# Patient Record
Sex: FEMALE | Race: WHITE | URBAN - NONMETROPOLITAN AREA
[De-identification: names, ages, dates, MRNs, and addresses within clinical notes are randomized per-mention and may not be internally consistent; named-entity substitution may affect disease eponyms.]

---

## 2022-06-15 ENCOUNTER — HOSPITAL ENCOUNTER (EMERGENCY)
Age: 52
Discharge: LEFT BEFORE DISCHARGE | End: 2022-06-15

## 2022-06-15 PROCEDURE — 75810000275 HC EMERGENCY DEPT VISIT NO LEVEL OF CARE

## 2022-11-28 ENCOUNTER — APPOINTMENT (OUTPATIENT)
Dept: CT IMAGING | Age: 52
End: 2022-11-28
Attending: INTERNAL MEDICINE

## 2022-11-28 ENCOUNTER — HOSPITAL ENCOUNTER (OUTPATIENT)
Age: 52
Setting detail: OBSERVATION
Discharge: HOME | End: 2022-11-29
Attending: INTERNAL MEDICINE | Admitting: INTERNAL MEDICINE

## 2022-11-28 ENCOUNTER — APPOINTMENT (OUTPATIENT)
Dept: GENERAL RADIOLOGY | Age: 52
End: 2022-11-28
Attending: INTERNAL MEDICINE

## 2022-11-28 DIAGNOSIS — N39.0 URINARY TRACT INFECTION WITHOUT HEMATURIA, SITE UNSPECIFIED: ICD-10-CM

## 2022-11-28 DIAGNOSIS — E83.42 HYPOMAGNESEMIA: ICD-10-CM

## 2022-11-28 DIAGNOSIS — R42 DIZZINESS: Primary | ICD-10-CM

## 2022-11-28 DIAGNOSIS — E87.20 LACTIC ACIDOSIS: ICD-10-CM

## 2022-11-28 PROBLEM — R55 NEAR SYNCOPE: Status: ACTIVE | Noted: 2022-11-28

## 2022-11-28 LAB
ALBUMIN SERPL-MCNC: 3.2 G/DL (ref 3.4–5)
ALBUMIN/GLOB SERPL: 0.6 {RATIO} (ref 0.8–1.7)
ALP SERPL-CCNC: 98 U/L (ref 45–117)
ALT SERPL-CCNC: 34 U/L (ref 13–56)
ANION GAP SERPL CALC-SCNC: 12 MMOL/L (ref 3–18)
APPEARANCE UR: ABNORMAL
AST SERPL W P-5'-P-CCNC: 61 U/L (ref 10–38)
ATRIAL RATE: 90 BPM
BACTERIA URNS QL MICRO: ABNORMAL /HPF
BASOPHILS # BLD: 0 K/UL (ref 0–0.1)
BASOPHILS NFR BLD: 0 % (ref 0–2)
BILIRUB SERPL-MCNC: 0.3 MG/DL (ref 0.2–1)
BILIRUB UR QL: ABNORMAL
BUN SERPL-MCNC: 12 MG/DL (ref 7–18)
BUN/CREAT SERPL: 12 (ref 12–20)
CA-I BLD-MCNC: 9.2 MG/DL (ref 8.5–10.1)
CALCULATED P AXIS, ECG09: 12 DEGREES
CALCULATED R AXIS, ECG10: 60 DEGREES
CALCULATED T AXIS, ECG11: 62 DEGREES
CHLORIDE SERPL-SCNC: 98 MMOL/L (ref 100–111)
CO2 SERPL-SCNC: 24 MMOL/L (ref 21–32)
COLOR UR: ABNORMAL
CREAT SERPL-MCNC: 0.99 MG/DL (ref 0.6–1.3)
DIAGNOSIS, 93000: NORMAL
DIFFERENTIAL METHOD BLD: ABNORMAL
EOSINOPHIL # BLD: 0.1 K/UL (ref 0–0.4)
EOSINOPHIL NFR BLD: 1 % (ref 0–5)
EPITH CASTS URNS QL MICRO: ABNORMAL /LPF (ref 0–20)
ERYTHROCYTE [DISTWIDTH] IN BLOOD BY AUTOMATED COUNT: 12.8 % (ref 11.6–14.5)
FLUAV AG NPH QL IA: NEGATIVE
FLUBV AG NOSE QL IA: NEGATIVE
GLOBULIN SER CALC-MCNC: 5.5 G/DL (ref 2–4)
GLUCOSE BLD STRIP.AUTO-MCNC: 195 MG/DL (ref 70–110)
GLUCOSE SERPL-MCNC: 225 MG/DL (ref 74–99)
GLUCOSE UR STRIP.AUTO-MCNC: 100 MG/DL
HCT VFR BLD AUTO: 36 % (ref 35–45)
HGB BLD-MCNC: 11.3 G/DL (ref 12–16)
HGB UR QL STRIP: NEGATIVE
IMM GRANULOCYTES # BLD AUTO: 0 K/UL (ref 0–0.04)
IMM GRANULOCYTES NFR BLD AUTO: 0 % (ref 0–0.5)
KETONES UR QL STRIP.AUTO: ABNORMAL MG/DL
LACTATE SERPL-SCNC: 3 MMOL/L (ref 0.4–2)
LACTATE SERPL-SCNC: 3.6 MMOL/L (ref 0.4–2)
LACTATE SERPL-SCNC: 4 MMOL/L (ref 0.4–2)
LACTATE SERPL-SCNC: 4 MMOL/L (ref 0.4–2)
LEUKOCYTE ESTERASE UR QL STRIP.AUTO: ABNORMAL
LYMPHOCYTES # BLD: 1.8 K/UL (ref 0.9–3.6)
LYMPHOCYTES NFR BLD: 24 % (ref 21–52)
MAGNESIUM SERPL-MCNC: 1.5 MG/DL (ref 1.6–2.6)
MCH RBC QN AUTO: 26.9 PG (ref 24–34)
MCHC RBC AUTO-ENTMCNC: 31.4 G/DL (ref 31–37)
MCV RBC AUTO: 85.7 FL (ref 78–100)
MONOCYTES # BLD: 0.4 K/UL (ref 0.05–1.2)
MONOCYTES NFR BLD: 5 % (ref 3–10)
NEUTS SEG # BLD: 5.1 K/UL (ref 1.8–8)
NEUTS SEG NFR BLD: 70 % (ref 40–73)
NITRITE UR QL STRIP.AUTO: NEGATIVE
NRBC # BLD: 0 K/UL (ref 0–0.01)
NRBC BLD-RTO: 0 PER 100 WBC
P-R INTERVAL, ECG05: 115 MS
PERFORMED BY, TECHID: ABNORMAL
PH UR STRIP: 6 [PH] (ref 5–8)
PLATELET # BLD AUTO: 255 K/UL (ref 135–420)
PMV BLD AUTO: 11.4 FL (ref 9.2–11.8)
POTASSIUM SERPL-SCNC: 4 MMOL/L (ref 3.5–5.5)
PROT SERPL-MCNC: 8.7 G/DL (ref 6.4–8.2)
PROT UR STRIP-MCNC: 30 MG/DL
Q-T INTERVAL, ECG07: 401 MS
QRS DURATION, ECG06: 87 MS
QTC CALCULATION (BEZET), ECG08: 491 MS
RBC # BLD AUTO: 4.2 M/UL (ref 4.2–5.3)
RBC #/AREA URNS HPF: ABNORMAL /HPF (ref 0–2)
SODIUM SERPL-SCNC: 134 MMOL/L (ref 136–145)
SP GR UR REFRACTOMETRY: 1.03 (ref 1–1.03)
TROPONIN-HIGH SENSITIVITY: 4 NG/L (ref 0–54)
UROBILINOGEN UR QL STRIP.AUTO: 1 EU/DL (ref 0.2–1)
VENTRICULAR RATE, ECG03: 90 BPM
WBC # BLD AUTO: 7.4 K/UL (ref 4.6–13.2)
WBC URNS QL MICRO: ABNORMAL /HPF (ref 0–4)

## 2022-11-28 PROCEDURE — G0378 HOSPITAL OBSERVATION PER HR: HCPCS

## 2022-11-28 PROCEDURE — 99285 EMERGENCY DEPT VISIT HI MDM: CPT

## 2022-11-28 PROCEDURE — 93005 ELECTROCARDIOGRAM TRACING: CPT

## 2022-11-28 PROCEDURE — 96375 TX/PRO/DX INJ NEW DRUG ADDON: CPT

## 2022-11-28 PROCEDURE — 74011250636 HC RX REV CODE- 250/636: Performed by: NURSE PRACTITIONER

## 2022-11-28 PROCEDURE — 82962 GLUCOSE BLOOD TEST: CPT

## 2022-11-28 PROCEDURE — 85025 COMPLETE CBC W/AUTO DIFF WBC: CPT

## 2022-11-28 PROCEDURE — 83605 ASSAY OF LACTIC ACID: CPT

## 2022-11-28 PROCEDURE — 87804 INFLUENZA ASSAY W/OPTIC: CPT

## 2022-11-28 PROCEDURE — 84484 ASSAY OF TROPONIN QUANT: CPT

## 2022-11-28 PROCEDURE — 74011250636 HC RX REV CODE- 250/636: Performed by: INTERNAL MEDICINE

## 2022-11-28 PROCEDURE — 80053 COMPREHEN METABOLIC PANEL: CPT

## 2022-11-28 PROCEDURE — 83735 ASSAY OF MAGNESIUM: CPT

## 2022-11-28 PROCEDURE — 96365 THER/PROPH/DIAG IV INF INIT: CPT

## 2022-11-28 PROCEDURE — 81001 URINALYSIS AUTO W/SCOPE: CPT

## 2022-11-28 PROCEDURE — 74011000258 HC RX REV CODE- 258: Performed by: NURSE PRACTITIONER

## 2022-11-28 PROCEDURE — 71045 X-RAY EXAM CHEST 1 VIEW: CPT

## 2022-11-28 PROCEDURE — 74011000250 HC RX REV CODE- 250: Performed by: NURSE PRACTITIONER

## 2022-11-28 PROCEDURE — 70450 CT HEAD/BRAIN W/O DYE: CPT

## 2022-11-28 PROCEDURE — 96366 THER/PROPH/DIAG IV INF ADDON: CPT

## 2022-11-28 PROCEDURE — 74011636637 HC RX REV CODE- 636/637: Performed by: NURSE PRACTITIONER

## 2022-11-28 PROCEDURE — 87086 URINE CULTURE/COLONY COUNT: CPT

## 2022-11-28 RX ORDER — LOSARTAN POTASSIUM 25 MG/1
25 TABLET ORAL DAILY
COMMUNITY
Start: 2022-03-21

## 2022-11-28 RX ORDER — SODIUM CHLORIDE 0.9 % (FLUSH) 0.9 %
5-40 SYRINGE (ML) INJECTION EVERY 8 HOURS
Status: DISCONTINUED | OUTPATIENT
Start: 2022-11-28 | End: 2022-11-29 | Stop reason: HOSPADM

## 2022-11-28 RX ORDER — ACETAMINOPHEN 325 MG/1
650 TABLET ORAL
Status: DISCONTINUED | OUTPATIENT
Start: 2022-11-28 | End: 2022-11-29 | Stop reason: HOSPADM

## 2022-11-28 RX ORDER — FLUOXETINE HYDROCHLORIDE 40 MG/1
80 CAPSULE ORAL DAILY
COMMUNITY
Start: 2022-10-18

## 2022-11-28 RX ORDER — PRAVASTATIN SODIUM 20 MG/1
10 TABLET ORAL DAILY
Status: DISCONTINUED | OUTPATIENT
Start: 2022-11-29 | End: 2022-11-29 | Stop reason: HOSPADM

## 2022-11-28 RX ORDER — FLUOXETINE HYDROCHLORIDE 20 MG/1
80 CAPSULE ORAL DAILY
Status: DISCONTINUED | OUTPATIENT
Start: 2022-11-29 | End: 2022-11-29 | Stop reason: HOSPADM

## 2022-11-28 RX ORDER — DROSPIRENONE AND ETHINYL ESTRADIOL 0.02-3(28)
1 KIT ORAL DAILY
COMMUNITY

## 2022-11-28 RX ORDER — MAGNESIUM SULFATE HEPTAHYDRATE 500 MG/ML
2 INJECTION, SOLUTION INTRAMUSCULAR; INTRAVENOUS
Status: DISCONTINUED | OUTPATIENT
Start: 2022-11-28 | End: 2022-11-28

## 2022-11-28 RX ORDER — SODIUM CHLORIDE 0.9 % (FLUSH) 0.9 %
5-40 SYRINGE (ML) INJECTION AS NEEDED
Status: DISCONTINUED | OUTPATIENT
Start: 2022-11-28 | End: 2022-11-29 | Stop reason: HOSPADM

## 2022-11-28 RX ORDER — ONDANSETRON 2 MG/ML
4 INJECTION INTRAMUSCULAR; INTRAVENOUS
Status: DISCONTINUED | OUTPATIENT
Start: 2022-11-28 | End: 2022-11-29 | Stop reason: HOSPADM

## 2022-11-28 RX ORDER — MAGNESIUM SULFATE 100 %
4 CRYSTALS MISCELLANEOUS AS NEEDED
Status: DISCONTINUED | OUTPATIENT
Start: 2022-11-28 | End: 2022-11-29 | Stop reason: HOSPADM

## 2022-11-28 RX ORDER — ENOXAPARIN SODIUM 100 MG/ML
40 INJECTION SUBCUTANEOUS DAILY
Status: DISCONTINUED | OUTPATIENT
Start: 2022-11-29 | End: 2022-11-29 | Stop reason: HOSPADM

## 2022-11-28 RX ORDER — SODIUM CHLORIDE 450 MG/100ML
100 INJECTION, SOLUTION INTRAVENOUS CONTINUOUS
Status: DISPENSED | OUTPATIENT
Start: 2022-11-28 | End: 2022-11-29

## 2022-11-28 RX ORDER — PRAVASTATIN SODIUM 10 MG/1
10 TABLET ORAL
COMMUNITY
Start: 2022-10-07

## 2022-11-28 RX ORDER — MAGNESIUM SULFATE HEPTAHYDRATE 40 MG/ML
2 INJECTION, SOLUTION INTRAVENOUS
Status: COMPLETED | OUTPATIENT
Start: 2022-11-28 | End: 2022-11-28

## 2022-11-28 RX ORDER — METFORMIN HYDROCHLORIDE 500 MG/1
1000 TABLET ORAL 2 TIMES DAILY WITH MEALS
COMMUNITY
Start: 2022-04-04 | End: 2022-11-29

## 2022-11-28 RX ORDER — INSULIN LISPRO 100 [IU]/ML
INJECTION, SOLUTION INTRAVENOUS; SUBCUTANEOUS
Status: DISCONTINUED | OUTPATIENT
Start: 2022-11-28 | End: 2022-11-29 | Stop reason: HOSPADM

## 2022-11-28 RX ORDER — ONDANSETRON 4 MG/1
4 TABLET, ORALLY DISINTEGRATING ORAL
Status: DISCONTINUED | OUTPATIENT
Start: 2022-11-28 | End: 2022-11-29 | Stop reason: HOSPADM

## 2022-11-28 RX ORDER — CYCLOBENZAPRINE HCL 10 MG
10 TABLET ORAL
Status: ON HOLD | COMMUNITY
Start: 2022-08-17 | End: 2022-11-29 | Stop reason: SDUPTHER

## 2022-11-28 RX ORDER — LOSARTAN POTASSIUM 25 MG/1
25 TABLET ORAL DAILY
Status: CANCELLED | OUTPATIENT
Start: 2022-11-29

## 2022-11-28 RX ORDER — ACETAMINOPHEN 650 MG/1
650 SUPPOSITORY RECTAL
Status: DISCONTINUED | OUTPATIENT
Start: 2022-11-28 | End: 2022-11-29 | Stop reason: HOSPADM

## 2022-11-28 RX ORDER — POLYETHYLENE GLYCOL 3350 17 G/17G
17 POWDER, FOR SOLUTION ORAL DAILY PRN
Status: DISCONTINUED | OUTPATIENT
Start: 2022-11-28 | End: 2022-11-29 | Stop reason: HOSPADM

## 2022-11-28 RX ADMIN — INSULIN LISPRO 2 UNITS: 100 INJECTION, SOLUTION INTRAVENOUS; SUBCUTANEOUS at 22:39

## 2022-11-28 RX ADMIN — SODIUM CHLORIDE, PRESERVATIVE FREE 10 ML: 5 INJECTION INTRAVENOUS at 21:31

## 2022-11-28 RX ADMIN — CEFTRIAXONE 1 G: 1 INJECTION, POWDER, FOR SOLUTION INTRAMUSCULAR; INTRAVENOUS at 21:31

## 2022-11-28 RX ADMIN — SODIUM CHLORIDE, PRESERVATIVE FREE 10 ML: 5 INJECTION INTRAVENOUS at 22:45

## 2022-11-28 RX ADMIN — SODIUM CHLORIDE 1000 ML: 9 INJECTION, SOLUTION INTRAVENOUS at 13:41

## 2022-11-28 RX ADMIN — SODIUM CHLORIDE 100 ML/HR: 4.5 INJECTION, SOLUTION INTRAVENOUS at 21:31

## 2022-11-28 RX ADMIN — MAGNESIUM SULFATE HEPTAHYDRATE 2 G: 40 INJECTION, SOLUTION INTRAVENOUS at 13:41

## 2022-11-28 NOTE — ED TRIAGE NOTES
States she feels like she had a seizure yesterday while [place] [employer],  states she went limp and he lowered her to the floor. Denies urination on self, states did bite tongue, has had a seizure in the past, does not take seizure meds. States she feel tired and dizzy with diarrhea x 2 this morning. Denies n/v. Denies pain. Dizziness increases with orthostatic bp , worse when changing positions.

## 2022-11-28 NOTE — ROUTINE PROCESS
TRANSFER - OUT REPORT:    Verbal report given to Regan Castorena LPN(name) on Benny Zabala  being transferred to [facility] (unit) for routine progression of care       Report consisted of patients Situation, Background, Assessment and   Recommendations(SBAR). Information from the following report(s) SBAR, ED Summary, MAR, and Recent Results was reviewed with the receiving nurse. Lines:   Peripheral IV 11/28/22 Right Antecubital (Active)   Site Assessment Clean, dry, & intact 11/28/22 0947   Phlebitis Assessment 0 11/28/22 0947   Infiltration Assessment 0 11/28/22 0947   Dressing Status Clean, dry, & intact 11/28/22 0947   Hub Color/Line Status Pink 11/28/22 0947   Action Taken Blood drawn 11/28/22 0947        Opportunity for questions and clarification was provided.       Patient transported with:   Monitor

## 2022-11-28 NOTE — ED PROVIDER NOTES
EMERGENCY DEPARTMENT HISTORY AND PHYSICAL EXAM      Date: 11/28/2022  Patient Name: Cletus Habermann    History of Presenting Illness     Chief Complaint   Patient presents with    Seizure       History Provided By: Patient    HPI: Cletus Habermann, 50 y.o. female with history of diabetes, hyperlipidemia, depression, elevated LFTs that states that she was dizzy yesterday when she was out shopping and that she felt lightheaded like she was going up faint and her  lowered her to the floor. She spent most of the yesterday in bed resting. No syncopal episode and no witnessed seizure activity. She states that she woke up this morning again feeling slightly dizzy along with a frontal headache. She states that she felt this way about 3 years ago and wondered having a seizure possibly related to hypoglycemia. She denies any nausea, vomiting, fever or chills. She states that he had discomfort in the right side of the neck. She had some mild diarrhea this morning. She is felt febrile. She denies any sore throat runny nose cough or abdominal pain. Patient does not smoke cigarettes. PCP Dr. Cameron  at the [facility]. She denies any chest pain, shortness of breath, hemoptysis. There are no other complaints, changes, or physical findings at this time. Past History   Past Medical History:  History reviewed. No pertinent past medical history. Past Surgical History:  History reviewed. No pertinent surgical history. Family History:  History reviewed. No pertinent family history. Social History:  Social History     Tobacco Use    Smoking status: Never    Smokeless tobacco: Never   Substance Use Topics    Alcohol use: Not Currently    Drug use: Not Currently       Allergies:  No Known Allergies    PCP: Karly Lopez MD    Current Outpatient Medications   Medication Sig Dispense Refill    cyclobenzaprine (FLEXERIL) 10 mg tablet Take 10 mg by mouth every eight (8) hours as needed. FLUoxetine (PROzac) 40 mg capsule Take 80 mg by mouth daily. pravastatin (PRAVACHOL) 10 mg tablet Take 10 mg by mouth.      losartan (COZAAR) 25 mg tablet Take 25 mg by mouth daily. SITagliptin (JANUVIA) 100 mg tablet Take 100 mg by mouth daily. metFORMIN (GLUCOPHAGE) 500 mg tablet Take 1,000 mg by mouth two (2) times daily (with meals). drospirenone-ethinyl estradioL (ANDREW) 3-0.02 mg tab Take 1 Tablet by mouth daily. Review of Systems   Review of Systems   Constitutional:  Positive for fever. Negative for chills. HENT:  Negative for rhinorrhea and sore throat. Respiratory:  Negative for cough and shortness of breath. Cardiovascular:  Negative for chest pain. Gastrointestinal:  Positive for diarrhea. Negative for abdominal pain and nausea. Genitourinary:  Negative for dysuria. Musculoskeletal:  Positive for neck pain. Negative for arthralgias. Skin:  Negative for rash. Neurological:  Positive for dizziness and light-headedness. Negative for syncope. Physical Exam   Physical Exam  Vitals and nursing note reviewed. Constitutional:       General: She is not in acute distress. Appearance: Normal appearance. She is well-developed. HENT:      Head: Normocephalic. Mouth/Throat:      Pharynx: No oropharyngeal exudate. Eyes:      Extraocular Movements: Extraocular movements intact. Pupils: Pupils are equal, round, and reactive to light. Neck:      Thyroid: No thyromegaly. Vascular: No JVD. Cardiovascular:      Rate and Rhythm: Normal rate and regular rhythm. Heart sounds: No murmur heard. No gallop. Pulmonary:      Effort: Pulmonary effort is normal. No respiratory distress. Breath sounds: Normal breath sounds. No wheezing or rales. Abdominal:      General: Bowel sounds are normal. There is no distension. Palpations: Abdomen is soft. Tenderness: There is no abdominal tenderness.    Musculoskeletal:         General: Normal range of motion. Cervical back: Neck supple. Right lower leg: No edema. Left lower leg: No edema. Skin:     General: Skin is warm. Findings: No erythema. Neurological:      Mental Status: She is alert and oriented to person, place, and time. Cranial Nerves: No cranial nerve deficit. Motor: No weakness. Psychiatric:         Behavior: Behavior normal.       Lab and Diagnostic Study Results   Labs -     Recent Results (from the past 12 hour(s))   EKG, 12 LEAD, INITIAL    Collection Time: 11/28/22  9:40 AM   Result Value Ref Range    Ventricular Rate 90 BPM    Atrial Rate 90 BPM    P-R Interval 115 ms    QRS Duration 87 ms    Q-T Interval 401 ms    QTC Calculation (Bezet) 491 ms    Calculated P Axis 12 degrees    Calculated R Axis 60 degrees    Calculated T Axis 62 degrees    Diagnosis       Sinus rhythm  Borderline short [place] interval  Borderline T abnormalities, anterior leads  Borderline prolonged QT interval  Baseline wander in lead(s) II,III,aVF    Confirmed by Lexx Bearden ([id number]) on 11/28/2022 11:21:32 AM     CBC WITH AUTOMATED DIFF    Collection Time: 11/28/22  9:47 AM   Result Value Ref Range    WBC 7.4 4.6 - 13.2 K/uL    RBC 4.20 4. 20 - 5.30 M/uL    HGB 11.3 (L) 12.0 - 16.0 g/dL    HCT 36.0 35.0 - 45.0 %    MCV 85.7 78.0 - 100.0 FL    MCH 26.9 24.0 - 34.0 PG    MCHC 31.4 31.0 - 37.0 g/dL    RDW 12.8 11.6 - 14.5 %    PLATELET 869 940 - 276 K/uL    MPV 11.4 9.2 - 11.8 FL    NRBC 0.0 0.0  WBC    ABSOLUTE NRBC 0.00 0.00 - 0.01 K/uL    NEUTROPHILS 70 40 - 73 %    LYMPHOCYTES 24 21 - 52 %    MONOCYTES 5 3 - 10 %    EOSINOPHILS 1 0 - 5 %    BASOPHILS 0 0 - 2 %    IMMATURE GRANULOCYTES 0 0 - 0.5 %    ABS. NEUTROPHILS 5.1 1.8 - 8.0 K/UL    ABS. LYMPHOCYTES 1.8 0.9 - 3.6 K/UL    ABS. MONOCYTES 0.4 0.05 - 1.2 K/UL    ABS. EOSINOPHILS 0.1 0.0 - 0.4 K/UL    ABS. BASOPHILS 0.0 0.0 - 0.1 K/UL    ABS. IMM.  GRANS. 0.0 0.00 - 0.04 K/UL    DF AUTOMATED     METABOLIC PANEL, COMPREHENSIVE Collection Time: 11/28/22  9:47 AM   Result Value Ref Range    Sodium 134 (L) 136 - 145 mmol/L    Potassium 4.0 3.5 - 5.5 mmol/L    Chloride 98 (L) 100 - 111 mmol/L    CO2 24 21 - 32 mmol/L    Anion gap 12 3.0 - 18.0 mmol/L    Glucose 225 (H) 74 - 99 mg/dL    BUN 12 7 - 18 mg/dL    Creatinine 0.99 0.60 - 1.30 mg/dL    BUN/Creatinine ratio 12 12 - 20      eGFR >60 >60 ml/min/1.73m2    Calcium 9.2 8.5 - 10.1 mg/dL    Bilirubin, total 0.3 0.2 - 1.0 mg/dL    AST (SGOT) 61 (H) 10 - 38 U/L    ALT (SGPT) 34 13 - 56 U/L    Alk.  phosphatase 98 45 - 117 U/L    Protein, total 8.7 (H) 6.4 - 8.2 g/dL    Albumin 3.2 (L) 3.4 - 5.0 g/dL    Globulin 5.5 (H) 2.0 - 4.0 g/dL    A-G Ratio 0.6 (L) 0.8 - 1.7     TROPONIN-HIGH SENSITIVITY    Collection Time: 11/28/22  9:47 AM   Result Value Ref Range    Troponin-High Sensitivity 4 0 - 54 ng/L   LACTIC ACID    Collection Time: 11/28/22  9:47 AM   Result Value Ref Range    Lactic acid 4.0 (HH) 0.4 - 2.0 mmol/L   MAGNESIUM    Collection Time: 11/28/22  9:47 AM   Result Value Ref Range    Magnesium 1.5 (L) 1.6 - 2.6 mg/dL   URINALYSIS W/ RFLX MICROSCOPIC    Collection Time: 11/28/22 10:09 AM   Result Value Ref Range    Color Dark Yellow      Appearance Cloudy      Specific gravity 1.030 1.005 - 1.030      pH (UA) 6.0 5.0 - 8.0      Protein 30 (A) Negative mg/dL    Glucose 100 (A) Negative mg/dL    Ketone Trace (A) Negative mg/dL    Bilirubin Small (A) Negative      Blood Negative Negative      Urobilinogen 1.0 0.2 - 1.0 EU/dL    Nitrites Negative Negative      Leukocyte Esterase Moderate (A) Negative     URINE MICROSCOPIC    Collection Time: 11/28/22 10:09 AM   Result Value Ref Range    WBC 5-10 0 - 4 /hpf    RBC 0-5 0 - 2 /hpf    Epithelial cells Many 0 - 20 /lpf    Bacteria 2+ (A) None /hpf   INFLUENZA A & B AG (RAPID TEST)    Collection Time: 11/28/22 12:50 PM   Result Value Ref Range    Influenza A Antigen Negative Negative      Influenza B Antigen Negative Negative     LACTIC ACID Collection Time: 11/28/22  1:05 PM   Result Value Ref Range    Lactic acid 3.6 (HH) 0.4 - 2.0 mmol/L   LACTIC ACID    Collection Time: 11/28/22  4:15 PM   Result Value Ref Range    Lactic acid 4.0 (HH) 0.4 - 2.0 mmol/L       Radiologic Studies -   [unfilled]  CT Results  (Last 48 hours)                 11/28/22 1111  CT HEAD WO CONT Final result    Impression:      No acute intracranial abnormality. Narrative:  EXAM: CT head       INDICATION: Headache. COMPARISON: 11/23/2019. TECHNIQUE: Axial CT imaging of the head was performed without intravenous   contrast. Standard multiplanar coronal and sagittal reformatted images were   obtained and are included in interpretation. One or more dose reduction techniques were used on this CT: automated exposure   control, adjustment of the mAs and/or kVp according to patient size, and   iterative reconstruction techniques. The specific techniques used on this CT   exam have been documented in the patient's electronic medical record. Digital   Imaging and Communications in Medicine (DICOM) format image data are available   to nonaffiliated external healthcare facilities or entities on a secure, media   free, reciprocally searchable basis with patient authorization for at least a   12-month period after this study. _______________       FINDINGS:       BRAIN AND POSTERIOR FOSSA: No evidence of acute large vessel transcortical   infarct or acute parenchymal hemorrhage. No midline shift or hydrocephalus. EXTRA-AXIAL SPACES AND MENINGES: There are no abnormal extra-axial fluid   collections. CALVARIUM: Intact. SINUSES: Clear.        OTHER: None.       _______________                 CXR Results  (Last 48 hours)                 11/28/22 1121  XR CHEST PORT Final result    Impression:      No acute findings in the chest.        Narrative:  EXAM: XR CHEST PORT       CLINICAL INDICATION/HISTORY: dizziness   -Additional: None COMPARISON: None       TECHNIQUE: Frontal view of the chest       _______________       FINDINGS:       HEART AND MEDIASTINUM: Normal cardiac size and mediastinal contours. LUNGS AND PLEURAL SPACES: No focal pneumonic consolidation, pneumothorax, or   pleural effusion. BONY THORAX AND SOFT TISSUES: No acute osseous abnormality       _______________                   Medical Decision Making and ED Course   Differential Diagnosis & Medical Decision Making Provider Note:   DDX: Dizziness - To include, but not limited to the following: vertigo, AOM, URI, anemia, electrolyte imbalance, hypoglycemia, hyperglycemia, hypotension, arrhythmia, TIA / CVA, near syncope, BPV, medication induced     - I am the first and primary provider for this patient. I reviewed the vital signs, available nursing notes, past medical history, past surgical history, family history and social history. The patient's presenting problems have been discussed, and the staff are in agreement with the care plan formulated and outlined with them. I have encouraged them to ask questions as they arise throughout their visit. Vital Signs-Reviewed the patient's vital signs. Patient Vitals for the past 12 hrs:   Temp Pulse Resp BP SpO2   11/28/22 1647 -- 89 17 130/77 99 %   11/28/22 1420 -- 100 18 (!) 114/58 99 %   11/28/22 1343 -- 90 21 129/77 99 %   11/28/22 1130 -- 95 18 120/77 100 %   11/28/22 0937 -- (!) 110 20 (!) 146/89 100 %   11/28/22 0935 -- 93 16 (!) 153/88 100 %   11/28/22 0928 98.3 °F (36.8 °C) 96 14 (!) 165/80 100 %       EKG interpretation: (Preliminary): EKG Interpreted by me. 0940 Shows normal sinus rhythm 90/min. Normal CT, normal QRS, normal axis, normal QTC. Nonspecific T wave changes. No acute ST segment changes.     ED Course:    4:10 PM  Pt feels better; labs and radiology reviewed; will repeat lactic and dc if normalizes    6:38 PM  Case discussed with DENA Noriega; pt with UTI and lactic acidosis; will admit    Procedures and Critical Care         Disposition   Disposition: DISCHARGE    Diagnosis/Clinical Impression     Clinical Impression:   1. Dizziness    2. Lactic acidosis    3. Hypomagnesemia    4. Urinary tract infection without hematuria, site unspecified        Attestations: Elmer Malloy MD, am the primary clinician of record. Please note that this dictation was completed with [employer], the computer voice recognition software. Quite often unanticipated grammatical, syntax, homophones, and other interpretive errors are inadvertently transcribed by the computer software. Please disregard these errors. Please excuse any errors that have escaped final proofreading. Thank you.

## 2022-11-29 ENCOUNTER — APPOINTMENT (OUTPATIENT)
Dept: NON INVASIVE DIAGNOSTICS | Age: 52
End: 2022-11-29
Attending: NURSE PRACTITIONER

## 2022-11-29 VITALS
DIASTOLIC BLOOD PRESSURE: 67 MMHG | BODY MASS INDEX: 32.95 KG/M2 | HEIGHT: 64 IN | SYSTOLIC BLOOD PRESSURE: 134 MMHG | RESPIRATION RATE: 18 BRPM | TEMPERATURE: 96.8 F | WEIGHT: 193 LBS | OXYGEN SATURATION: 100 % | HEART RATE: 86 BPM

## 2022-11-29 LAB
ANION GAP SERPL CALC-SCNC: 10 MMOL/L (ref 3–18)
BACTERIA SPEC CULT: NORMAL
BUN SERPL-MCNC: 8 MG/DL (ref 7–18)
BUN/CREAT SERPL: 11 (ref 12–20)
CA-I BLD-MCNC: 8.6 MG/DL (ref 8.5–10.1)
CHLORIDE SERPL-SCNC: 102 MMOL/L (ref 100–111)
CO2 SERPL-SCNC: 24 MMOL/L (ref 21–32)
COLONY COUNT,CNT: NORMAL
COLONY COUNT,CNT: NORMAL
CREAT SERPL-MCNC: 0.71 MG/DL (ref 0.6–1.3)
ECHO AO ASC DIAM: 3 CM
ECHO AO ASCENDING AORTA INDEX: 1.55 CM/M2
ECHO AO ROOT DIAM: 3.1 CM
ECHO AO ROOT INDEX: 1.61 CM/M2
ECHO AV AREA PEAK VELOCITY: 2.8 CM2
ECHO AV AREA VTI: 2.9 CM2
ECHO AV AREA/BSA PEAK VELOCITY: 1.5 CM2/M2
ECHO AV AREA/BSA VTI: 1.5 CM2/M2
ECHO AV MEAN GRADIENT: 4 MMHG
ECHO AV MEAN VELOCITY: 0.9 M/S
ECHO AV PEAK GRADIENT: 6 MMHG
ECHO AV PEAK VELOCITY: 1.2 M/S
ECHO AV VELOCITY RATIO: 0.83
ECHO AV VTI: 24 CM
ECHO LA AREA 2C: 16.5 CM2
ECHO LA AREA 4C: 21.9 CM2
ECHO LA DIAMETER INDEX: 2.18 CM/M2
ECHO LA DIAMETER: 4.2 CM
ECHO LA MAJOR AXIS: 6.4 CM
ECHO LA MINOR AXIS: 5.3 CM
ECHO LA TO AORTIC ROOT RATIO: 1.35
ECHO LA VOL BP: 51 ML (ref 22–52)
ECHO LA VOL/BSA BIPLANE: 26 ML/M2 (ref 16–34)
ECHO LV E' LATERAL VELOCITY: 12 CM/S
ECHO LV E' SEPTAL VELOCITY: 8 CM/S
ECHO LV EDV A2C: 41 ML
ECHO LV EDV A4C: 52 ML
ECHO LV EDV INDEX A4C: 27 ML/M2
ECHO LV EDV NDEX A2C: 21 ML/M2
ECHO LV EJECTION FRACTION A2C: 65 %
ECHO LV EJECTION FRACTION A4C: 65 %
ECHO LV EJECTION FRACTION BIPLANE: 65 % (ref 55–100)
ECHO LV ESV A2C: 14 ML
ECHO LV ESV A4C: 18 ML
ECHO LV ESV INDEX A2C: 7 ML/M2
ECHO LV ESV INDEX A4C: 9 ML/M2
ECHO LV FRACTIONAL SHORTENING: 36 % (ref 28–44)
ECHO LV INTERNAL DIMENSION DIASTOLE INDEX: 2.18 CM/M2
ECHO LV INTERNAL DIMENSION DIASTOLIC: 4.2 CM (ref 3.9–5.3)
ECHO LV INTERNAL DIMENSION SYSTOLIC INDEX: 1.4 CM/M2
ECHO LV INTERNAL DIMENSION SYSTOLIC: 2.7 CM
ECHO LV IVSD: 1 CM (ref 0.6–0.9)
ECHO LV MASS 2D: 137.2 G (ref 67–162)
ECHO LV MASS INDEX 2D: 71.1 G/M2 (ref 43–95)
ECHO LV POSTERIOR WALL DIASTOLIC: 1 CM (ref 0.6–0.9)
ECHO LV RELATIVE WALL THICKNESS RATIO: 0.48
ECHO LVOT AREA: 3.5 CM2
ECHO LVOT AV VTI INDEX: 0.85
ECHO LVOT DIAM: 2.1 CM
ECHO LVOT MEAN GRADIENT: 2 MMHG
ECHO LVOT PEAK GRADIENT: 4 MMHG
ECHO LVOT PEAK VELOCITY: 1 M/S
ECHO LVOT STROKE VOLUME INDEX: 36.4 ML/M2
ECHO LVOT SV: 70.3 ML
ECHO LVOT VTI: 20.3 CM
ECHO MV A VELOCITY: 0.85 M/S
ECHO MV AREA VTI: 4.3 CM2
ECHO MV E DECELERATION TIME (DT): 151 MS
ECHO MV E VELOCITY: 0.96 M/S
ECHO MV E/A RATIO: 1.13
ECHO MV E/E' LATERAL: 8
ECHO MV E/E' RATIO (AVERAGED): 10
ECHO MV E/E' SEPTAL: 12
ECHO MV LVOT VTI INDEX: 0.81
ECHO MV MAX VELOCITY: 1.1 M/S
ECHO MV MEAN GRADIENT: 2 MMHG
ECHO MV MEAN VELOCITY: 0.7 M/S
ECHO MV PEAK GRADIENT: 5 MMHG
ECHO MV VTI: 16.5 CM
ECHO PV MAX VELOCITY: 0.8 M/S
ECHO PV PEAK GRADIENT: 3 MMHG
ECHO RA AREA 4C: 13.6 CM2
ECHO RA END SYSTOLIC VOLUME APICAL 4 CHAMBER INDEX BSA: 16 ML/M2
ECHO RA VOLUME: 30 ML
ECHO RV BASAL DIMENSION: 3.6 CM
ECHO RV TAPSE: 1.6 CM (ref 1.7–?)
ERYTHROCYTE [DISTWIDTH] IN BLOOD BY AUTOMATED COUNT: 12.7 % (ref 11.6–14.5)
GLUCOSE BLD STRIP.AUTO-MCNC: 148 MG/DL (ref 70–110)
GLUCOSE BLD STRIP.AUTO-MCNC: 240 MG/DL (ref 70–110)
GLUCOSE SERPL-MCNC: 132 MG/DL (ref 74–99)
HCT VFR BLD AUTO: 33.1 % (ref 35–45)
HGB BLD-MCNC: 10.4 G/DL (ref 12–16)
LACTATE SERPL-SCNC: 1.8 MMOL/L (ref 0.4–2)
LACTATE SERPL-SCNC: 2.4 MMOL/L (ref 0.4–2)
LACTATE SERPL-SCNC: 2.8 MMOL/L (ref 0.4–2)
MAGNESIUM SERPL-MCNC: 1.9 MG/DL (ref 1.6–2.6)
MCH RBC QN AUTO: 26.7 PG (ref 24–34)
MCHC RBC AUTO-ENTMCNC: 31.4 G/DL (ref 31–37)
MCV RBC AUTO: 85.1 FL (ref 78–100)
NRBC # BLD: 0 K/UL (ref 0–0.01)
NRBC BLD-RTO: 0 PER 100 WBC
PERFORMED BY, TECHID: ABNORMAL
PERFORMED BY, TECHID: ABNORMAL
PLATELET # BLD AUTO: 199 K/UL (ref 135–420)
PMV BLD AUTO: 11.2 FL (ref 9.2–11.8)
POTASSIUM SERPL-SCNC: 4 MMOL/L (ref 3.5–5.5)
RBC # BLD AUTO: 3.89 M/UL (ref 4.2–5.3)
SODIUM SERPL-SCNC: 136 MMOL/L (ref 136–145)
SPECIAL REQUESTS,SREQ: NORMAL
WBC # BLD AUTO: 5.7 K/UL (ref 4.6–13.2)

## 2022-11-29 PROCEDURE — 83605 ASSAY OF LACTIC ACID: CPT

## 2022-11-29 PROCEDURE — 36415 COLL VENOUS BLD VENIPUNCTURE: CPT

## 2022-11-29 PROCEDURE — 74011000250 HC RX REV CODE- 250: Performed by: NURSE PRACTITIONER

## 2022-11-29 PROCEDURE — 96374 THER/PROPH/DIAG INJ IV PUSH: CPT

## 2022-11-29 PROCEDURE — 97161 PT EVAL LOW COMPLEX 20 MIN: CPT

## 2022-11-29 PROCEDURE — 74011636637 HC RX REV CODE- 636/637: Performed by: NURSE PRACTITIONER

## 2022-11-29 PROCEDURE — 74011250637 HC RX REV CODE- 250/637: Performed by: NURSE PRACTITIONER

## 2022-11-29 PROCEDURE — 80048 BASIC METABOLIC PNL TOTAL CA: CPT

## 2022-11-29 PROCEDURE — 85027 COMPLETE CBC AUTOMATED: CPT

## 2022-11-29 PROCEDURE — 83735 ASSAY OF MAGNESIUM: CPT

## 2022-11-29 PROCEDURE — 82962 GLUCOSE BLOOD TEST: CPT

## 2022-11-29 PROCEDURE — G0378 HOSPITAL OBSERVATION PER HR: HCPCS

## 2022-11-29 RX ORDER — CIPROFLOXACIN 500 MG/1
500 TABLET ORAL 2 TIMES DAILY
Qty: 14 TABLET | Refills: 0 | Status: SHIPPED | OUTPATIENT
Start: 2022-11-29 | End: 2022-12-06

## 2022-11-29 RX ORDER — CYCLOBENZAPRINE HCL 10 MG
10 TABLET ORAL
Qty: 30 TABLET | Refills: 0 | Status: SHIPPED
Start: 2022-11-29 | End: 2022-12-29

## 2022-11-29 RX ADMIN — FLUOXETINE 80 MG: 20 CAPSULE ORAL at 08:11

## 2022-11-29 RX ADMIN — INSULIN LISPRO 4 UNITS: 100 INJECTION, SOLUTION INTRAVENOUS; SUBCUTANEOUS at 11:42

## 2022-11-29 RX ADMIN — SODIUM CHLORIDE, PRESERVATIVE FREE 10 ML: 5 INJECTION INTRAVENOUS at 05:33

## 2022-11-29 RX ADMIN — PRAVASTATIN SODIUM 10 MG: 20 TABLET ORAL at 08:11

## 2022-11-29 RX ADMIN — ACETAMINOPHEN 650 MG: 325 TABLET ORAL at 06:14

## 2022-11-29 RX ADMIN — SODIUM CHLORIDE 100 ML/HR: 4.5 INJECTION, SOLUTION INTRAVENOUS at 06:57

## 2022-11-29 NOTE — PROGRESS NOTES
Problem: Mobility Impaired (Adult and Pediatric)  Goal: *Acute Goals and Plan of Care (Insert Text)  Description: Pt ambulates with complete (I) and reports she is at her baseline . PLOF: Community ambulator, no AD, (I) ADLs    Outcome: Resolved/Met     Problem: Patient Education: Go to Patient Education Activity  Goal: Patient/Family Education  Outcome: Resolved/Met   PHYSICAL THERAPY EVALUATION AND DISCHARGE    Patient: Joel Perry (50 y.o. female)  Date: 11/29/2022   Start Time: 0936   Stop Time: 1095  $$ Initial PT Evaluation: Low Complex 20 Min  Primary Diagnosis: Lactic acidosis [E87.20]  UTI (urinary tract infection) [N39.0]       Precautions: N/A       ASSESSMENT :  Based on the objective data described below, the patient presents with presyncopal event. She reports that she is feeling much better and has been up multiple times without c/o dizziness. She performs all mobility with complete (I) and states she is at her baseline. Patient does not require further skilled intervention at this level of care. PLAN :  Recommendations and Planned Interventions:   No formal PT needs identified at this time. Discharge Recommendations: Home as prior  AM-PAC: 24/24  Further Equipment Recommendations for Discharge: N/A     SUBJECTIVE:   Patient stated I'm feeling better.     OBJECTIVE DATA SUMMARY:   History reviewed. No pertinent past medical history. History reviewed. No pertinent surgical history.   Barriers to Learning/Limitations: None  Compensate with: N/A  Home Situation:   Home Situation  Home Environment: Private residence  # Steps to Enter: 3  Rails to Enter: Yes  Hand Rails : Bilateral  One/Two Story Residence: Two story  # of Interior Steps: 14  Interior Rails: Both  Lift Chair Available: No  Living Alone: No  Support Systems: Spouse/Significant Other  Patient Expects to be Discharged to[de-identified] Home  Current DME Used/Available at Home: None  Critical Behavior:  Neurologic State: Alert  Orientation Level: Oriented X4        Psychosocial  Purposeful Interaction: Yes  Pt Identified Daily Priority: Clinical issues (comment)  [employer] Process: Nurture loving kindness; Attend basic human needs;Create healing environment; Teaching/learning  Caring Interventions: Therapeutic modalities  Therapeutic Modalities: Intentional therapeutic touch;Humor  Strength:    Strength: Within functional limits    Tone & Sensation:   Tone: Normal  Sensation: Intact  Coordination:  Coordination: Within functional limits  Range Of Motion:   AROM: Within functional limits  PROM: Within functional limits    Posture:  Posture (WDL): Within defined limits     Functional Mobility:  Bed Mobility:  Rolling: Independent  Supine to Sit: Independent  Sit to Supine: Independent  Scooting: Independent  Transfers:  Sit to Stand: Independent  Stand to Sit: Independent  Balance:   Sitting: Intact  Standing: Intact  Ambulation/Gait Training:  Distance (ft): 300 Feet (ft)  Ambulation - Level of Assistance: Independent   AM-PAC:  24/24; Current research shows that an AM-PAC score of 17 or less is typically not associated with a discharge to the patient's home setting, whereas a score of 18 or greater is typically associated with a discharge to the patient's home setting. 11/29/22 0947   Vital Signs   Pulse (Heart Rate) (!) 101   BP (!) 157/83   MAP (Calculated) 108   BP 1 Location Left upper arm     Pain:  Pain level pre-treatment: 0/10   Pain level post-treatment: 0/10  Pain Location: N/A  Activity Tolerance:   Good  Please refer to the flowsheet for vital signs taken during this treatment.   After treatment:   []         Patient left in no apparent distress sitting up in chair  [x]         Patient left in no apparent distress in bed  [x]         Call bell left within reach  [x]         Nursing notified  []         Caregiver present  []         Bed alarm activated  []         SCDs applied    COMMUNICATION/EDUCATION:   [x]         Role of Physical Therapy in the acute care setting. [x]         Fall prevention education was provided and the patient/caregiver indicated understanding. [x]         Patient/family have participated as able in goal setting and plan of care. [x]         Patient/family agree to work toward stated goals and plan of care. []         Patient understands intent and goals of therapy, but is neutral about his/her participation. []         Patient is unable to participate in goal setting/plan of care: ongoing with therapy staff.  []         Other:     Thank you for this referral.  Joe Middleton, PT, DPT   Time Calculation: 11 mins

## 2022-11-29 NOTE — ASSESSMENT & PLAN NOTE
-IV hydration  -Blood cultures  -Urine cultures  -Ceftriaxone 1 g every 24 hours time 5 days  -Vital signs per unit routine  -As needed Tylenol for fevers  -Daily CBC and BMP

## 2022-11-29 NOTE — PROGRESS NOTES
Problem: Falls - Risk of  Goal: *Absence of Falls  Description: Document Wadelberlin Sotelo Fall Risk and appropriate interventions in the flowsheet.   Outcome: Progressing Towards Goal  Note: Fall Risk Interventions:

## 2022-11-29 NOTE — H&P
History and Physical    Subjective:     Sheridan Nowak is a 50 y.o. female with a past medical history for diabetes mellitus, hypercholesteremia, hypertension, and seizures, patient presented to the ED with a chief complaint of near syncope episode. Patient reports that she was out shopping where she started to feel lightheaded and dizziness, and almost passed out, patient denies loss of consciousness. Other accompanying symptoms includes headache and fevers, patient denies chest pain, palpitations, shortness of breath, coughing, nausea, vomiting, diarrhea, and abdominal pain. In the ED patient was noted to have a UA with moderate leukocyte esterase, a lactic acid of 4, T 61, magnesium 1.5. While in the ED patient did receive rounds of IV magnesium and a 1 L fluid bolus. Hospital medicine asked to admit patient due to high risk for decompensation related to elevated lactic acid. Patient assessed at the bedside, patient is alert and oriented x4, there are no acute signs and symptoms of distress noted at this time patient agrees to admission for a diagnosis of lactic acidosis and urinary tract infection, treatment to include IV hydration, IV fluids, and electrolyte replacement. Admit to telemetry. History reviewed. No pertinent past medical history. History reviewed. No pertinent surgical history. History reviewed. No pertinent family history. Social History     Tobacco Use    Smoking status: Never    Smokeless tobacco: Never   Substance Use Topics    Alcohol use: Not Currently       Prior to Admission medications    Medication Sig Start Date End Date Taking? Authorizing Provider   cyclobenzaprine (FLEXERIL) 10 mg tablet Take 10 mg by mouth every eight (8) hours as needed. 8/17/22  Yes Other, MD Vanessa   FLUoxetine (PROzac) 40 mg capsule Take 80 mg by mouth daily. 10/18/22  Yes Other, MD Vanessa   pravastatin (PRAVACHOL) 10 mg tablet Take 10 mg by mouth.  10/7/22  Yes Other, MD Vanessa   losartan (COZAAR) 25 mg tablet Take 25 mg by mouth daily. 3/21/22  Yes Other, MD Vanessa   SITagliptin (JANUVIA) 100 mg tablet Take 100 mg by mouth daily. 8/23/22  Yes Other, MD Vanessa   drospirenone-ethinyl estradioL (ANDREW) 3-0.02 mg tab Take 1 Tablet by mouth daily. Yes Other, MD Vanessa   metFORMIN (GLUCOPHAGE) 500 mg tablet Take 1,000 mg by mouth two (2) times daily (with meals). 4/4/22  Yes Other, MD Vanessa     No Known Allergies       REVIEW OF SYSTEMS:       Total of 12 systems reviewed as follows:    Positive = Red  Constitutional: Negative for malaise/fatigue and weakness, Positive for fever    HENT: Negative for ear pain, headaches, negative for loss of sense of taste and smell   Eyes: Negative for blurred vision and double vision   Skin: Negative for itching, negative for open areas   Cardiovascular: Negative for chest pain, palpitations, negative for swelling   Respiratory: Negative for shortness or breath, negative for cough, negative for sputum production   Gastrointestinal: Negative for abdominal pain, constipation, nausea, vomiting, and diarrhea   Genitourinary: Negative for dysuria, frequency, and hematuria   Musculoskeletal: Negative for joint pain and myalgias   Neurological: Positive for dizziness and headaches   Psychiatric: Negative for depression and anxiousness            Objective:   VITALS:    Visit Vitals  BP (!) 156/78 (BP 1 Location: Left upper arm, BP Patient Position: At rest;Sitting)   Pulse 92   Temp 97.1 °F (36.2 °C)   Resp 18   Ht 5' 4\" (1.626 m)   Wt 87.5 kg (193 lb)   SpO2 99%   Breastfeeding No   BMI 33.13 kg/m²       PHYSICAL EXAM:  Positive = Red  Constitutional: Alert and oriented x 3 and no noted acute distress appears to be stated age.    HENT: Atraumatic, nose midline, oropharynx clear ad moist, trachea midline, no supraclavicular   Eyes: Conjunctiva normal and pupils equal   Skin: Dry, intact, warm, and dry   Cardiovascular: Regular rate and rhythm, normal heart sounds, no murmurs, pulses palpable, no noted edema   Respiratory: Lungs clear throughout, no wheezes, rales, or rhonchi, effort normal   Gastrointestinal: Appearance normal, bowel sounds are normal, bowl soft and non-tender   Genitourinary: Deferred   Musculoskeletal: Normal ROM   Neurological: Alert and oriented x 3, awake. No facial droop. No slurred speech. Hand grasps equal. Strength 5/5 in all extremities. Intact sensations   Psychiatric: Affect normal, Answers questions appropriately     __________________________________________________  Care Plan discussed with:    Comments   Patient X    Family      RN     Care Manager                    Consultant:      _______________________________________________________________________  Expected  Disposition:   Home with Family X   HH/PT/OT/RN    SNF/LTC    TRICE    ________________________________________________________________________    Labs:  Recent Results (from the past 24 hour(s))   EKG, 12 LEAD, INITIAL    Collection Time: 11/28/22  9:40 AM   Result Value Ref Range    Ventricular Rate 90 BPM    Atrial Rate 90 BPM    P-R Interval 115 ms    QRS Duration 87 ms    Q-T Interval 401 ms    QTC Calculation (Bezet) 491 ms    Calculated P Axis 12 degrees    Calculated R Axis 60 degrees    Calculated T Axis 62 degrees    Diagnosis       Sinus rhythm  Borderline short HI interval  Borderline T abnormalities, anterior leads  Borderline prolonged QT interval  Baseline wander in lead(s) II,III,aVF    Confirmed by Felix Kelsey ([id number]) on 11/28/2022 11:21:32 AM     CBC WITH AUTOMATED DIFF    Collection Time: 11/28/22  9:47 AM   Result Value Ref Range    WBC 7.4 4.6 - 13.2 K/uL    RBC 4.20 4. 20 - 5.30 M/uL    HGB 11.3 (L) 12.0 - 16.0 g/dL    HCT 36.0 35.0 - 45.0 %    MCV 85.7 78.0 - 100.0 FL    MCH 26.9 24.0 - 34.0 PG    MCHC 31.4 31.0 - 37.0 g/dL    RDW 12.8 11.6 - 14.5 %    PLATELET 843 697 - 231 K/uL    MPV 11.4 9.2 - 11.8 FL    NRBC 0.0 0.0  WBC    ABSOLUTE NRBC 0.00 0.00 - 0.01 K/uL NEUTROPHILS 70 40 - 73 %    LYMPHOCYTES 24 21 - 52 %    MONOCYTES 5 3 - 10 %    EOSINOPHILS 1 0 - 5 %    BASOPHILS 0 0 - 2 %    IMMATURE GRANULOCYTES 0 0 - 0.5 %    ABS. NEUTROPHILS 5.1 1.8 - 8.0 K/UL    ABS. LYMPHOCYTES 1.8 0.9 - 3.6 K/UL    ABS. MONOCYTES 0.4 0.05 - 1.2 K/UL    ABS. EOSINOPHILS 0.1 0.0 - 0.4 K/UL    ABS. BASOPHILS 0.0 0.0 - 0.1 K/UL    ABS. IMM. GRANS. 0.0 0.00 - 0.04 K/UL    DF AUTOMATED     METABOLIC PANEL, COMPREHENSIVE    Collection Time: 11/28/22  9:47 AM   Result Value Ref Range    Sodium 134 (L) 136 - 145 mmol/L    Potassium 4.0 3.5 - 5.5 mmol/L    Chloride 98 (L) 100 - 111 mmol/L    CO2 24 21 - 32 mmol/L    Anion gap 12 3.0 - 18.0 mmol/L    Glucose 225 (H) 74 - 99 mg/dL    BUN 12 7 - 18 mg/dL    Creatinine 0.99 0.60 - 1.30 mg/dL    BUN/Creatinine ratio 12 12 - 20      eGFR >60 >60 ml/min/1.73m2    Calcium 9.2 8.5 - 10.1 mg/dL    Bilirubin, total 0.3 0.2 - 1.0 mg/dL    AST (SGOT) 61 (H) 10 - 38 U/L    ALT (SGPT) 34 13 - 56 U/L    Alk.  phosphatase 98 45 - 117 U/L    Protein, total 8.7 (H) 6.4 - 8.2 g/dL    Albumin 3.2 (L) 3.4 - 5.0 g/dL    Globulin 5.5 (H) 2.0 - 4.0 g/dL    A-G Ratio 0.6 (L) 0.8 - 1.7     TROPONIN-HIGH SENSITIVITY    Collection Time: 11/28/22  9:47 AM   Result Value Ref Range    Troponin-High Sensitivity 4 0 - 54 ng/L   LACTIC ACID    Collection Time: 11/28/22  9:47 AM   Result Value Ref Range    Lactic acid 4.0 (HH) 0.4 - 2.0 mmol/L   MAGNESIUM    Collection Time: 11/28/22  9:47 AM   Result Value Ref Range    Magnesium 1.5 (L) 1.6 - 2.6 mg/dL   URINALYSIS W/ RFLX MICROSCOPIC    Collection Time: 11/28/22 10:09 AM   Result Value Ref Range    Color Dark Yellow      Appearance Cloudy      Specific gravity 1.030 1.005 - 1.030      pH (UA) 6.0 5.0 - 8.0      Protein 30 (A) Negative mg/dL    Glucose 100 (A) Negative mg/dL    Ketone Trace (A) Negative mg/dL    Bilirubin Small (A) Negative      Blood Negative Negative      Urobilinogen 1.0 0.2 - 1.0 EU/dL    Nitrites Negative Negative      Leukocyte Esterase Moderate (A) Negative     URINE MICROSCOPIC    Collection Time: 11/28/22 10:09 AM   Result Value Ref Range    WBC 5-10 0 - 4 /hpf    RBC 0-5 0 - 2 /hpf    Epithelial cells Many 0 - 20 /lpf    Bacteria 2+ (A) None /hpf   INFLUENZA A & B AG (RAPID TEST)    Collection Time: 11/28/22 12:50 PM   Result Value Ref Range    Influenza A Antigen Negative Negative      Influenza B Antigen Negative Negative     LACTIC ACID    Collection Time: 11/28/22  1:05 PM   Result Value Ref Range    Lactic acid 3.6 (HH) 0.4 - 2.0 mmol/L   LACTIC ACID    Collection Time: 11/28/22  4:15 PM   Result Value Ref Range    Lactic acid 4.0 (HH) 0.4 - 2.0 mmol/L       Imaging:  CT HEAD WO CONT    Result Date: 11/28/2022  EXAM: CT head INDICATION: Headache. COMPARISON: 11/23/2019. TECHNIQUE: Axial CT imaging of the head was performed without intravenous contrast. Standard multiplanar coronal and sagittal reformatted images were obtained and are included in interpretation. One or more dose reduction techniques were used on this CT: automated exposure control, adjustment of the mAs and/or kVp according to patient size, and iterative reconstruction techniques. The specific techniques used on this CT exam have been documented in the patient's electronic medical record. Digital Imaging and Communications in Medicine (DICOM) format image data are available to nonaffiliated external healthcare facilities or entities on a secure, media free, reciprocally searchable basis with patient authorization for at least a 12-month period after this study. _______________ FINDINGS: BRAIN AND POSTERIOR FOSSA: No evidence of acute large vessel transcortical infarct or acute parenchymal hemorrhage. No midline shift or hydrocephalus. EXTRA-AXIAL SPACES AND MENINGES: There are no abnormal extra-axial fluid collections. CALVARIUM: Intact. SINUSES: Clear. OTHER: None. _______________     No acute intracranial abnormality.      XR CHEST PORT    Result Date: 11/28/2022  EXAM: XR CHEST PORT CLINICAL INDICATION/HISTORY: dizziness -Additional: None COMPARISON: None TECHNIQUE: Frontal view of the chest _______________ FINDINGS: HEART AND MEDIASTINUM: Normal cardiac size and mediastinal contours. LUNGS AND PLEURAL SPACES: No focal pneumonic consolidation, pneumothorax, or pleural effusion. BONY THORAX AND SOFT TISSUES: No acute osseous abnormality _______________     No acute findings in the chest.       Assessment & Plan:     Near syncope  -start IV hydration  -EKG/ continuous telemetry monitoring   -check orthostatic BP  -I&O  -CBC, BMP< LFTs, UA  -ECHO ordered  -CT head No acute intracranial abnormality    UTI (urinary tract infection)  -IV hydration  -Blood cultures  -Urine cultures  -Ceftriaxone 1 g every 24 hours time 5 days  -Vital signs per unit routine  -As needed Tylenol for fevers  -Daily CBC and BMP    Lactic acidosis  -lactic acid 4, fluids given, repeat 3.6, continue to trend  -patient denies seizure activity    Hypertension  -chronic  -continue Losartan  -monitor BP closely    Diabetes Mellitus Type 2  -holding oral antidiabetics  -implement sliding scale and accu-checks prior to meals and bedtime    Hypercholesteremia  -continue statin    TOTAL TIME:  45 Minutes    Code Status: Full    Prophylaxis:  Lovenox    Electronically Signed : Shary Schirmer, [facility] CenterJUANITA-BC [address]    Please note that this dictation was completed with [employer], the computer voice recognition software. Quite often unanticipated grammatical, syntax, homophones, and other interpretive errors are inadvertently transcribed by the computer software. Please disregard these errors. Please excuse any errors that have escaped final proofreading. Thank you.

## 2022-11-29 NOTE — PROGRESS NOTES
060-bedside shift report received; pt watching tv  0810-patient assessment( no changes from previous shift    Orthostatic bps:    Supine: 130/68, hr 66  Sittin/73 hr 70  Standin/72 hr 78    Pt ambulating in hallway with PT; no distress, no complaints of dizziness  MD perdomo  1230-DC instructions/med's reviewed; pt had no further questions, iv removed,pt transported to vehicle

## 2022-11-29 NOTE — PROGRESS NOTES
Care Management Interventions  PCP Verified by CM: Yes ([facility])  Mode of Transport at Discharge: Self  Transition of Care Consult (CM Consult): Discharge Planning  Support Systems: Spouse/Significant Other  Confirm Follow Up Transport: Self  The Plan for Transition of Care is Related to the Following Treatment Goals : Discharge planning/Transition of care to ensure smooth transtion ot community, home, PLOF. The Patient and/or Patient Representative was Provided with a Choice of Provider and Agrees with the Discharge Plan?: Yes  Name of the Patient Representative Who was Provided with a Choice of Provider and Agrees with the Discharge Plan: Pt  Kenia Bhagat, , Erasto Reyes  Freedom of Choice List was Provided with Basic Dialogue that Supports the Patient's Individualized Plan of Care/Goals, Treatment Preferences and Shares the Quality Data Associated with the Providers?: Yes  Discharge Location  Patient Expects to be Discharged to[de-identified] Home   Pt placed on OBS status for near syncopal episode. ED work-up showed UTI, lactic of 4, and Mag of 1.5. Hospitalist was consulted for OBS placement due to high risk of decompensation related to lactic acid. 1 liter of IV fluid was given and electrolytes were replaced. Pt was discharged on home oral antibiotics without any symptoms. Pt lives with her  and works daily. She denies needing any help at home and states her  will transport her home.   Discharge instructions given by nursing and reinforced by SAJI

## 2022-11-29 NOTE — PROGRESS NOTES
1947 - Pt arrived via stretcher and ED staff. Pt ambulated to hospital bed. Pt denies any c/o pain or discomfort. Admission assessment complete and V/s obtained. Oriented pt to room and use of bed and call bell. Provided pt's  with recliner and linens to spend the night. 2131 - IV fluids and abx started as ordered per [place].     2138 - Blood glucose checked and is 195, 2 units SSI will be administered. 2158 - Hospitalist notified of elevated but trending downwards lactic acid, no new orders received. 2239 - 2 units SSI administered to pt. Pt tolerated well. No needs expressed at this time.  remains at bedside. CBWR.     0052 - V/s obtained. Pt denies any pain or needs at this time. CBWR.     0210 - Assisted pt to bathroom and back to bed.     0500 - Rounded on pt, pt assisted to bathroom and back to bed.     0600 - Pt used CB to c/o 2/10 headache, PRN Tylenol offered and administered to pt. Provided with requested apple juice and ice chips. CBWR.

## 2022-11-29 NOTE — ASSESSMENT & PLAN NOTE
-start IV hydration  -EKG/ continuous telemetry monitoring   -check orthostatic BP  -I&O  -CBC, BMP< LFTs, UA  -ECHO ordered  -CT head No acute intracranial abnormality

## 2022-11-29 NOTE — ED NOTES
Report given to night shift RN Arianne Heath and Karina  Report has been given to [place] on 2nd floor, ED staff aware of taking patient to room 252 after shift change

## 2022-11-29 NOTE — DISCHARGE SUMMARY
HOSPITALIST DISCHARGE NOTE  Hai Fitch MD, [address], [place]       PATIENT ID: Myranda Scales  MRN: [id number]   YOB: 1970    DATE OF ADMISSION: 11/28/2022  9:27 AM    DATE OF DISCHARGE: 11/29/22    PRIMARY CARE PROVIDER: Cristina Martinez MD     ATTENDING PHYSICIAN: Hai Fitch MD  DISCHARGING PROVIDER: Hai Fitch MD        CONSULTATIONS: None    PROCEDURES/SURGERIES: * No surgery found *    ADMITTING [address] [place] [address] COURSE:     Myranda Scales is a 50 y.o. female with a past medical history for diabetes mellitus, hypercholesteremia, hypertension, and seizures, patient presented to the ED with a chief complaint of near syncope episode. Patient reports that she was out shopping where she started to feel lightheaded and dizziness, and almost passed out, patient denies loss of consciousness. Other accompanying symptoms includes headache and fevers, patient denies chest pain, palpitations, shortness of breath, coughing, nausea, vomiting, diarrhea, and abdominal pain. In the ED patient was noted to have a UA with moderate leukocyte esterase, a lactic acid of 4, T 61, magnesium 1.5. While in the ED patient did receive rounds of IV magnesium and a 1 L fluid bolus. Hospital medicine asked to admit patient due to high risk for decompensation related to elevated lactic acid. Patient assessed at the bedside, patient is alert and oriented x4, there are no acute signs and symptoms of distress noted at this time patient agrees to admission for a diagnosis of lactic acidosis and urinary tract infection, treatment to include IV hydration, IV fluids, and electrolyte replacement. DISCHARGE DIAGNOSES / PLAN:      Near syncope  EKG/ continuous telemetry monitoring   Orthostatic blood pressures are normal.  No significant syncope noted. ECHO performed however pending result.   Patient without any symptoms of CHF, orthopnea, PND, dyspnea on exertion. CT head No acute intracranial abnormality. Likely secondary to significant diarrhea/viral gastroenteritis, over the last 3 to 4 days which has now resolved. Patient ambulating in the room without any nausea/vomiting, diarrhea, fever/chills and without hypoxia. Patient would like to be discharged home on p.o. antibiotics. UTI (urinary tract infection)  Pending urine cultures  -Ceftriaxone 1 g every 24 hours time 5 days  Patient is stable enough to be discharged home on Cipro 500 mg twice daily x7 days. Follow-up urine cultures with PCP. Spurious lactic acidosis  Lactic acid 4, fluids given, repeat 3.6. Normalized to 1.8 after IV fluids and then repeat was increased again to 2.8. No signs or sequelae of sepsis noted. No significant anion gap or acidosis noted on BMP. CO2 on BMP is completely normal.  Possibly secondary to metformin use therefore will discontinue metformin till follow-up with PCP. Hypertension  Continue Losartan     Diabetes Mellitus Type 2  Holding oral antidiabetics  -implement sliding scale and accu-checks prior to meals and bedtime. Discontinue metformin secondary to lactic acidosis. Continue Januvia. Hypercholesteremia  Continue statin    PENDING TEST RESULTS:   At the time of discharge the following test results are still pending: None    FOLLOW UP APPOINTMENTS:    Follow-up Information       Follow up With Specialties Details Why Contact Info    Maria G Sheppard MD Internal Medicine Physician   [address] [facility] [address] [phone]             DIET: Diabetic Diet    ACTIVITY: Activity as tolerated    DISCHARGE MEDICATIONS:  Current Discharge Medication List        START taking these medications    Details   ciprofloxacin HCl (CIPRO) 500 mg tablet Take 1 Tablet by mouth two (2) times a day for 7 days.  Indications: bacterial urinary tract infection  Qty: 14 Tablet, Refills: 0  Start date: 11/29/2022, End date: 12/6/2022           CONTINUE these medications which have CHANGED    Details   cyclobenzaprine (FLEXERIL) 10 mg tablet Take 1 Tablet by mouth nightly for 30 days. Qty: 30 Tablet, Refills: 0  Start date: 11/29/2022, End date: 12/29/2022           CONTINUE these medications which have NOT CHANGED    Details   FLUoxetine (PROzac) 40 mg capsule Take 80 mg by mouth daily. pravastatin (PRAVACHOL) 10 mg tablet Take 10 mg by mouth.      losartan (COZAAR) 25 mg tablet Take 25 mg by mouth daily. SITagliptin (JANUVIA) 100 mg tablet Take 100 mg by mouth daily. drospirenone-ethinyl estradioL (ANDREW) 3-0.02 mg tab Take 1 Tablet by mouth daily. STOP taking these medications       metFORMIN (GLUCOPHAGE) 500 mg tablet Comments:   Reason for Stopping:                 Recent Days:  Recent Labs     11/29/22 0419 11/28/22  0947   WBC 5.7 7.4   HGB 10.4* 11.3*   HCT 33.1* 36.0    255     Recent Labs     11/29/22 0419 11/28/22  0947    134*   K 4.0 4.0    98*   CO2 24 24   * 225*   BUN 8 12   CREA 0.71 0.99   CA 8.6 9.2   MG 1.9 1.5*   ALB  --  3.2*   TBILI  --  0.3   ALT  --  34     No results for input(s): PH, PCO2, PO2, HCO3, FIO2 in the last 72 hours. Recent Results (from the past 336 hour(s))   EKG, 12 LEAD, INITIAL    Collection Time: 11/28/22  9:40 AM   Result Value Ref Range    Ventricular Rate 90 BPM    Atrial Rate 90 BPM    P-R Interval 115 ms    QRS Duration 87 ms    Q-T Interval 401 ms    QTC Calculation (Bezet) 491 ms    Calculated P Axis 12 degrees    Calculated R Axis 60 degrees    Calculated T Axis 62 degrees    Diagnosis       Sinus rhythm  Borderline short MS interval  Borderline T abnormalities, anterior leads  Borderline prolonged QT interval  Baseline wander in lead(s) II,III,aVF    Confirmed by Lexx Bearden ([id number]) on 11/28/2022 11:21:32 AM     CBC WITH AUTOMATED DIFF    Collection Time: 11/28/22  9:47 AM   Result Value Ref Range    WBC 7.4 4.6 - 13.2 K/uL    RBC 4.20 4. 20 - 5.30 M/uL    HGB 11.3 (L) 12.0 - 16.0 g/dL    HCT 36.0 35.0 - 45.0 %    MCV 85.7 78.0 - 100.0 FL    MCH 26.9 24.0 - 34.0 PG    MCHC 31.4 31.0 - 37.0 g/dL    RDW 12.8 11.6 - 14.5 %    PLATELET 340 793 - 913 K/uL    MPV 11.4 9.2 - 11.8 FL    NRBC 0.0 0.0  WBC    ABSOLUTE NRBC 0.00 0.00 - 0.01 K/uL    NEUTROPHILS 70 40 - 73 %    LYMPHOCYTES 24 21 - 52 %    MONOCYTES 5 3 - 10 %    EOSINOPHILS 1 0 - 5 %    BASOPHILS 0 0 - 2 %    IMMATURE GRANULOCYTES 0 0 - 0.5 %    ABS. NEUTROPHILS 5.1 1.8 - 8.0 K/UL    ABS. LYMPHOCYTES 1.8 0.9 - 3.6 K/UL    ABS. MONOCYTES 0.4 0.05 - 1.2 K/UL    ABS. EOSINOPHILS 0.1 0.0 - 0.4 K/UL    ABS. BASOPHILS 0.0 0.0 - 0.1 K/UL    ABS. IMM. GRANS. 0.0 0.00 - 0.04 K/UL    DF AUTOMATED     METABOLIC PANEL, COMPREHENSIVE    Collection Time: 11/28/22  9:47 AM   Result Value Ref Range    Sodium 134 (L) 136 - 145 mmol/L    Potassium 4.0 3.5 - 5.5 mmol/L    Chloride 98 (L) 100 - 111 mmol/L    CO2 24 21 - 32 mmol/L    Anion gap 12 3.0 - 18.0 mmol/L    Glucose 225 (H) 74 - 99 mg/dL    BUN 12 7 - 18 mg/dL    Creatinine 0.99 0.60 - 1.30 mg/dL    BUN/Creatinine ratio 12 12 - 20      eGFR >60 >60 ml/min/1.73m2    Calcium 9.2 8.5 - 10.1 mg/dL    Bilirubin, total 0.3 0.2 - 1.0 mg/dL    AST (SGOT) 61 (H) 10 - 38 U/L    ALT (SGPT) 34 13 - 56 U/L    Alk.  phosphatase 98 45 - 117 U/L    Protein, total 8.7 (H) 6.4 - 8.2 g/dL    Albumin 3.2 (L) 3.4 - 5.0 g/dL    Globulin 5.5 (H) 2.0 - 4.0 g/dL    A-G Ratio 0.6 (L) 0.8 - 1.7     TROPONIN-HIGH SENSITIVITY    Collection Time: 11/28/22  9:47 AM   Result Value Ref Range    Troponin-High Sensitivity 4 0 - 54 ng/L   LACTIC ACID    Collection Time: 11/28/22  9:47 AM   Result Value Ref Range    Lactic acid 4.0 (HH) 0.4 - 2.0 mmol/L   MAGNESIUM    Collection Time: 11/28/22  9:47 AM   Result Value Ref Range    Magnesium 1.5 (L) 1.6 - 2.6 mg/dL   URINALYSIS W/ RFLX MICROSCOPIC    Collection Time: 11/28/22 10:09 AM   Result Value Ref Range    Color Dark Yellow      Appearance Cloudy Specific gravity 1.030 1.005 - 1.030      pH (UA) 6.0 5.0 - 8.0      Protein 30 (A) Negative mg/dL    Glucose 100 (A) Negative mg/dL    Ketone Trace (A) Negative mg/dL    Bilirubin Small (A) Negative      Blood Negative Negative      Urobilinogen 1.0 0.2 - 1.0 EU/dL    Nitrites Negative Negative      Leukocyte Esterase Moderate (A) Negative     URINE MICROSCOPIC    Collection Time: 11/28/22 10:09 AM   Result Value Ref Range    WBC 5-10 0 - 4 /hpf    RBC 0-5 0 - 2 /hpf    Epithelial cells Many 0 - 20 /lpf    Bacteria 2+ (A) None /hpf   INFLUENZA A & B AG (RAPID TEST)    Collection Time: 11/28/22 12:50 PM   Result Value Ref Range    Influenza A Antigen Negative Negative      Influenza B Antigen Negative Negative     LACTIC ACID    Collection Time: 11/28/22  1:05 PM   Result Value Ref Range    Lactic acid 3.6 (HH) 0.4 - 2.0 mmol/L   LACTIC ACID    Collection Time: 11/28/22  4:15 PM   Result Value Ref Range    Lactic acid 4.0 (HH) 0.4 - 2.0 mmol/L   GLUCOSE, POC    Collection Time: 11/28/22  9:38 PM   Result Value Ref Range    Glucose (POC) 195 (H) 70 - 110 mg/dL    Performed by Carson Albert    LACTIC ACID    Collection Time: 11/28/22  9:58 PM   Result Value Ref Range    Lactic acid 3.0 (HH) 0.4 - 2.0 mmol/L   METABOLIC PANEL, BASIC    Collection Time: 11/29/22  4:19 AM   Result Value Ref Range    Sodium 136 136 - 145 mmol/L    Potassium 4.0 3.5 - 5.5 mmol/L    Chloride 102 100 - 111 mmol/L    CO2 24 21 - 32 mmol/L    Anion gap 10 3.0 - 18.0 mmol/L    Glucose 132 (H) 74 - 99 mg/dL    BUN 8 7 - 18 mg/dL    Creatinine 0.71 0.60 - 1.30 mg/dL    BUN/Creatinine ratio 11 (L) 12 - 20      eGFR >60 >60 ml/min/1.73m2    Calcium 8.6 8.5 - 10.1 mg/dL   MAGNESIUM    Collection Time: 11/29/22  4:19 AM   Result Value Ref Range    Magnesium 1.9 1.6 - 2.6 mg/dL   CBC W/O DIFF    Collection Time: 11/29/22  4:19 AM   Result Value Ref Range    WBC 5.7 4.6 - 13.2 K/uL    RBC 3.89 (L) 4.20 - 5.30 M/uL    HGB 10.4 (L) 12.0 - 16.0 g/dL HCT 33.1 (L) 35.0 - 45.0 %    MCV 85.1 78.0 - 100.0 FL    MCH 26.7 24.0 - 34.0 PG    MCHC 31.4 31.0 - 37.0 g/dL    RDW 12.7 11.6 - 14.5 %    PLATELET 080 165 - 880 K/uL    MPV 11.2 9.2 - 11.8 FL    NRBC 0.0 0.0  WBC    ABSOLUTE NRBC 0.00 0.00 - 0.01 K/uL   LACTIC ACID    Collection Time: 11/29/22  4:19 AM   Result Value Ref Range    Lactic acid 1.8 0.4 - 2.0 mmol/L   GLUCOSE, POC    Collection Time: 11/29/22  6:56 AM   Result Value Ref Range    Glucose (POC) 148 (H) 70 - 110 mg/dL    Performed by Sommer Elaine    ECHO ADULT COMPLETE    Collection Time: 11/29/22  8:13 AM   Result Value Ref Range    LV EDV A2C 41 mL    LV EDV A4C 52 mL    LV ESV A2C 14 mL    LV ESV A4C 18 mL    IVSd 1.0 (A) 0.6 - 0.9 cm    LVIDd 4.2 3.9 - 5.3 cm    LVIDs 2.7 cm    LVOT Diameter 2.1 cm    LVOT Mean Gradient 2 mmHg    LVOT VTI 20.3 cm    LVOT Peak Velocity 1.0 m/s    LVOT Peak Gradient 4 mmHg    LVPWd 1.0 (A) 0.6 - 0.9 cm    LV E' Lateral Velocity 12 cm/s    LV E' Septal Velocity 8 cm/s    LV Ejection Fraction A2C 65 %    LV Ejection Fraction A4C 65 %    EF BP 65 55 - 100 %    LVOT Area 3.5 cm2    LVOT SV 70.0 ml    LA Minor Axis 5.3 cm    LA Major Emerson 6.4 cm    LA Area 2C 16.5 cm2    LA Area 4C 21.9 cm2    LA Volume BP 51 22 - 52 mL    LA Diameter 4.2 cm    RA Area 4C 13.6 cm2    RA Volume 30 ml    AV Mean Gradient 4 mmHg    AV VTI 24.0 cm    AV Mean Velocity 0.9 m/s    AV Peak Velocity 1.2 m/s    AV Peak Gradient 6 mmHg    AV Area by VTI 2.9 cm2    AV Area by Peak Velocity 2.8 cm2    Aortic Root 3.1 cm    Ascending Aorta 3.0 cm    MV E Wave Deceleration Time 151.0 ms    MV A Velocity 0.85 m/s    MV E Velocity 0.96 m/s    MV Mean Gradient 2 mmHg    MV VTI 16.5 cm    MV Mean Velocity 0.7 m/s    MV Max Velocity 1.1 m/s    MV Peak Gradient 5 mmHg    MV Area by PHT 5.0 cm2    MV Area by VTI 4.3 cm2    PV Max Velocity 0.8 m/s    PV Peak Gradient 3 mmHg    RV Basal Dimension 3.6 cm    TAPSE 1.6 (A) 1.7 cm   LACTIC ACID Collection Time: 11/29/22 10:00 AM   Result Value Ref Range    Lactic acid 2.8 (HH) 0.4 - 2.0 mmol/L   GLUCOSE, POC    Collection Time: 11/29/22 11:28 AM   Result Value Ref Range    Glucose (POC) 240 (H) 70 - 110 mg/dL    Performed by Shwetha Villalobos         NOTIFY YOUR PHYSICIAN FOR ANY OF THE FOLLOWING:   Fever over 101 degrees for 24 hours. Chest pain, shortness of breath, fever, chills, nausea, vomiting, diarrhea, change in mentation, falling, weakness, bleeding. Severe pain or pain not relieved by medications. Or, any other signs or symptoms that you may have questions about. DISPOSITION:   x Home With:   OT  PT  HH  RN       Long term SNF/Inpatient Rehab    Independent/assisted living    Hospice    Other:       PATIENT CONDITION AT DISCHARGE:     Functional status    Poor     Deconditioned    x Independent      Cognition    x Lucid     Forgetful     Dementia      Catheters/lines (plus indication)    [place]     PICC     PEG    x None      Code status   x  Full code     DNR      PHYSICAL EXAMINATION AT DISCHARGE:  General:          Alert, cooperative, no distress, appears stated age. Neck:               Supple, symmetrical  Lungs:             Clear to auscultation bilaterally. No Wheezing or Rhonchi. No rales. Chest wall:      No tenderness  No Accessory muscle use. Heart:              Regular  rhythm,  No  murmur   No edema  Abdomen:        Soft, non-tender. Not distended. Bowel sounds normal  Extremities:     No cyanosis. No clubbing,                            Skin turgor normal, Capillary refill normal  Skin:                Not pale. Not Jaundiced  No rashes   Psych:             Not anxious or agitated.   Neurologic:      Alert, moves all extremities, answers questions appropriately and responds to commands       CHRONIC MEDICAL DIAGNOSES:  Problem List as of 11/29/2022 Never Reviewed            Codes Class Noted - Resolved    Lactic acidosis ICD-10-CM: E87.20  ICD-9-CM: 276.2  11/28/2022 - Present        UTI (urinary tract infection) ICD-10-CM: N39.0  ICD-9-CM: 599.0  11/28/2022 - Present        * (Principal) Near syncope ICD-10-CM: R55  ICD-9-CM: 780.2  11/28/2022 - Present           Greater than 35 minutes were spent with the patient on counseling and coordination of care    Signed:   Armen Layton MD  11/29/2022  11:58 AM

## 2022-12-15 ENCOUNTER — APPOINTMENT (OUTPATIENT)
Dept: GENERAL RADIOLOGY | Age: 52
End: 2022-12-15
Attending: EMERGENCY MEDICINE

## 2022-12-15 ENCOUNTER — HOSPITAL ENCOUNTER (EMERGENCY)
Age: 52
Discharge: HOME | End: 2022-12-16
Attending: EMERGENCY MEDICINE

## 2022-12-15 DIAGNOSIS — S46.911A MUSCLE STRAIN OF RIGHT UPPER ARM, INITIAL ENCOUNTER: Primary | ICD-10-CM

## 2022-12-15 PROCEDURE — 99283 EMERGENCY DEPT VISIT LOW MDM: CPT | Performed by: EMERGENCY MEDICINE

## 2022-12-15 PROCEDURE — 73060 X-RAY EXAM OF HUMERUS: CPT

## 2022-12-15 RX ORDER — IBUPROFEN 400 MG/1
800 TABLET ORAL
Status: COMPLETED | OUTPATIENT
Start: 2022-12-15 | End: 2022-12-16

## 2022-12-16 VITALS
DIASTOLIC BLOOD PRESSURE: 86 MMHG | OXYGEN SATURATION: 100 % | TEMPERATURE: 98.3 F | HEIGHT: 64 IN | HEART RATE: 88 BPM | SYSTOLIC BLOOD PRESSURE: 140 MMHG | BODY MASS INDEX: 31.07 KG/M2 | WEIGHT: 182 LBS | RESPIRATION RATE: 18 BRPM

## 2022-12-16 PROCEDURE — 74011250637 HC RX REV CODE- 250/637: Performed by: EMERGENCY MEDICINE

## 2022-12-16 RX ORDER — IBUPROFEN 600 MG/1
600 TABLET ORAL
Qty: 18 TABLET | Refills: 0 | Status: SHIPPED | OUTPATIENT
Start: 2022-12-16

## 2022-12-16 RX ADMIN — IBUPROFEN 800 MG: 400 TABLET, FILM COATED ORAL at 00:05

## 2022-12-16 NOTE — ED PROVIDER NOTES
Pt c/o rt upper arm pain, says started while lifting a heavy mattress this am.  No dir injury. No prior arm pain. No weakness or numbness. No wound. No rash or redness. No fever. No meds for pain pta. Min pain at rest, but much worse when tries to lift rt shoulder. No rash or swelling. No cp or sob. Past Medical History:   Diagnosis Date    Diabetes ([facility] [address]     High cholesterol     Hypertension        History reviewed. No pertinent surgical history. History reviewed. No pertinent family history. Social History     Socioeconomic History    Marital status:      Spouse name: Not on file    Number of children: Not on file    Years of education: Not on file    Highest education level: Not on file   Occupational History    Not on file   Tobacco Use    Smoking status: Never    Smokeless tobacco: Never   Vaping Use    Vaping Use: Not on file   Substance and Sexual Activity    Alcohol use: Not Currently    Drug use: Not Currently    Sexual activity: Yes     Partners: Male   Other Topics Concern    Not on file   Social History Narrative    Not on file     Social Determinants of Health     Financial Resource Strain: Not on file   Food Insecurity: Not on file   Transportation Needs: Not on file   Physical Activity: Not on file   Stress: Not on file   Social Connections: Not on file   Intimate Partner Violence: Not on file   Housing Stability: Not on file         ALLERGIES: Patient has no known allergies. Review of Systems   Constitutional:  Negative for fever. Respiratory:  Negative for cough and shortness of breath. Cardiovascular:  Negative for chest pain. Gastrointestinal:  Negative for vomiting. Musculoskeletal:  Positive for arthralgias and myalgias. Negative for back pain. Skin:  Negative for rash. Neurological:  Negative for weakness and numbness. All other systems reviewed and are negative.     Vitals:    12/15/22 2306   BP: (!) 144/82   Pulse: 82   Resp: 18   Temp: 98.3 °F (36.8 °C)   SpO2: 100%   Weight: 82.6 kg (182 lb)   Height: 5' 4\" (1.626 m)            Physical Exam  Vitals and nursing note reviewed. Constitutional:       Appearance: She is well-developed. She is not diaphoretic. HENT:      Head: Normocephalic and atraumatic. Eyes:      Conjunctiva/sclera: Conjunctivae normal.   Cardiovascular:      Rate and Rhythm: Normal rate and regular rhythm. Pulses: Normal pulses. Pulmonary:      Effort: Pulmonary effort is normal.      Breath sounds: No wheezing. Musculoskeletal:         General: Tenderness present. Cervical back: Normal range of motion. Comments: Rt upper arm ttp mild. Pain w shoulder abduction. No rash or swelling. Nvi. No back ttp   Skin:     General: Skin is dry. Capillary Refill: Capillary refill takes less than 2 seconds. Findings: No rash. Neurological:      Mental Status: She is alert and oriented to person, place, and time. Sensory: No sensory deficit. Motor: No weakness. Psychiatric:         Mood and Affect: Mood normal.        MDM         Procedures      Vitals:  Patient Vitals for the past 12 hrs:   Temp Pulse Resp BP SpO2   12/15/22 2306 98.3 °F (36.8 °C) 82 18 (!) 144/82 100 %         Medications ordered:   Medications   ibuprofen (MOTRIN) tablet 800 mg (800 mg Oral Given 12/16/22 0005)         Lab findings:  No results found for this or any previous visit (from the past 12 hour(s)). X-Ray, CT or other radiology findings or impressions:  XR HUMERUS RT    (Results Pending)             Progress notes, Consult notes or additional Procedure notes:   12:12 AM nvi. No fx. No emc. Not c/w pad/dvt. Stable for dc and close f/up. Det ret inst given . Diagnosis:   1.  Muscle strain of right upper arm, initial encounter        Disposition: home    Follow-up Information       Follow up With Specialties Details Why Contact [facility] EMERGENCY DEPT Emergency Medicine Go to  As needed, If symptoms worsen [address] [address]  [phone]    Glenn Arellano MD Orthopedic Surgery Schedule an appointment as soon as possible for a visit in 1 week  [address] [facility]      Maite Berg MD Internal Medicine Physician   [address]  Jia Gilman MD Orthopedic Surgery Schedule an appointment as soon as possible for a visit in 1 week As needed [address] [facility]  [phone]               Patient's Medications   Start Taking    IBUPROFEN (MOTRIN) 600 MG TABLET    Take 1 Tablet by mouth every six (6) hours as needed for Pain. Continue Taking    CYCLOBENZAPRINE (FLEXERIL) 10 MG TABLET    Take 1 Tablet by mouth nightly for 30 days. DROSPIRENONE-ETHINYL ESTRADIOL (ANDREW) 3-0.02 MG TAB    Take 1 Tablet by mouth daily. FLUOXETINE (PROZAC) 40 MG CAPSULE    Take 80 mg by mouth daily. LOSARTAN (COZAAR) 25 MG TABLET    Take 25 mg by mouth daily. PRAVASTATIN (PRAVACHOL) 10 MG TABLET    Take 10 mg by mouth. SITAGLIPTIN (JANUVIA) 100 MG TABLET    Take 100 mg by mouth daily.    These Medications have changed    No medications on file   Stop Taking    No medications on file

## 2022-12-16 NOTE — ED TRIAGE NOTES
Patient state she woke up this morning and felt fine but after getting to work she wad been moving things at work and her right shoulder started hurting. States she can barely lift her arm and rates pain a 10/10.

## 2022-12-28 PROBLEM — N39.0 UTI (URINARY TRACT INFECTION): Status: RESOLVED | Noted: 2022-11-28 | Resolved: 2022-12-28

## 2023-01-11 ENCOUNTER — HOSPITAL ENCOUNTER (EMERGENCY)
Age: 53
Discharge: HOME | End: 2023-01-11
Attending: EMERGENCY MEDICINE

## 2023-01-11 VITALS
TEMPERATURE: 97.5 F | DIASTOLIC BLOOD PRESSURE: 80 MMHG | SYSTOLIC BLOOD PRESSURE: 156 MMHG | HEART RATE: 87 BPM | BODY MASS INDEX: 30.56 KG/M2 | OXYGEN SATURATION: 100 % | RESPIRATION RATE: 17 BRPM | HEIGHT: 64 IN | WEIGHT: 179 LBS

## 2023-01-11 DIAGNOSIS — M54.16 ACUTE RIGHT LUMBAR RADICULOPATHY: Primary | ICD-10-CM

## 2023-01-11 PROCEDURE — 99284 EMERGENCY DEPT VISIT MOD MDM: CPT

## 2023-01-11 PROCEDURE — 96372 THER/PROPH/DIAG INJ SC/IM: CPT

## 2023-01-11 PROCEDURE — 74011250636 HC RX REV CODE- 250/636: Performed by: EMERGENCY MEDICINE

## 2023-01-11 RX ORDER — TIZANIDINE 4 MG/1
4 TABLET ORAL
Qty: 15 TABLET | Refills: 0 | Status: SHIPPED | OUTPATIENT
Start: 2023-01-11

## 2023-01-11 RX ORDER — NAPROXEN 500 MG/1
500 TABLET ORAL
Qty: 10 TABLET | Refills: 0 | Status: SHIPPED | OUTPATIENT
Start: 2023-01-11

## 2023-01-11 RX ORDER — KETOROLAC TROMETHAMINE 30 MG/ML
30 INJECTION, SOLUTION INTRAMUSCULAR; INTRAVENOUS ONCE
Status: COMPLETED | OUTPATIENT
Start: 2023-01-11 | End: 2023-01-11

## 2023-01-11 RX ORDER — HYDROCODONE BITARTRATE AND ACETAMINOPHEN 5; 325 MG/1; MG/1
1 TABLET ORAL
Qty: 9 TABLET | Refills: 0 | Status: SHIPPED | OUTPATIENT
Start: 2023-01-11 | End: 2023-01-14

## 2023-01-11 RX ADMIN — KETOROLAC TROMETHAMINE 30 MG: 30 INJECTION, SOLUTION INTRAMUSCULAR at 11:39

## 2023-01-11 NOTE — ED TRIAGE NOTES
Pt reports right hip pain that started yesterday, reports it goes through the hip down the back of the leg.

## 2023-01-11 NOTE — DISCHARGE INSTRUCTIONS
It was a pleasure taking care of you in our Emergency Department today. We know that when you come to [facility], you are entrusting us with your health, comfort, and safety. Our physicians and nurses honor that trust, and truly appreciate the opportunity to care for you and your loved ones. We also value your feedback. If you receive a survey about your Emergency Department experience today, please fill it out. We care about our patients' feedback, and we listen to what you have to say. Please read over your discharge instructions as these contain pertinent information to help you in the healing process. These instructions include a list of prescriptions you were given today. Follow-up information is also noted on your discharge papers. There are attached instructions and information pertaining to the reason why you were seen in the emergency department today. These discharge instructions may not be for exactly why you were here, but may be the closest available instructions that we have. These include important advice for things that you can do at home to feel better, and reasons to return to the emergency department. The evaluation and treatment you received in the emergency department is not always definitive care. If follow-up with your primary care doctor or specialist was recommended, it is important that you make these appointments for follow-up care. You may need further testing, procedures, and/or medications to help you feel better. Further tests may be required that are not available in the emergency department. Failure to make these follow-up appointments may jeopardize your health. The emergency department is here for emergent stabilization and evaluation of life and limb threatening illness and/or injuries. Further care through a specialist or primary care doctor may be required to assist in your healing and complete your treatment and/or evaluation.   We may not always be able to make a diagnosis in the emergency department, or things may change that will alter your diagnosis. Our primary goal is to ensure that nothing serious is occurring and that you are stable to continue your treatment and evaluation at home as an outpatient. Of course, if things change, and you feel worse, you are always encouraged to return to the emergency department for re-evaluation. Lab Results Today:  No results found for this or any previous visit (from the past 8 hour(s)). Radiology Results Today:  No results found.

## 2023-01-11 NOTE — ED PROVIDER NOTES
EMERGENCY DEPARTMENT HISTORY AND PHYSICAL EXAM      Date: 1/11/2023  Patient Name: Sherin Morris    History of Presenting Illness     Chief Complaint   Patient presents with    Hip Pain     right       History Provided By: Patient    HPI: Sherin Morris, 50 y.o. female  presents to the ED with cc of right lower extremity pain. Patient states since yesterday she has been having pain from the right lower back radiating down across the right hip down the right leg into the foot. She states the leg does feel weak and is painful to move. No numbness. History of chronic low back pain. No prior spinal surgeries. No injuries to report prior to this episode of pain. No urinary complaints. Patient has been taking over-the-counter NSAIDs with little relief. She does have a history of diabetes with most recent A1c greater than 9 just this week. Past History     Past Medical History:  Past Medical History:   Diagnosis Date    Diabetes ([address]     High cholesterol     Hypertension        Past Surgical History:  History reviewed. No pertinent surgical history. Medications:  No current facility-administered medications on file prior to encounter. Current Outpatient Medications on File Prior to Encounter   Medication Sig Dispense Refill    dapagliflozin (FARXIGA) 10 mg tab tablet Take 10 mg by mouth daily. ibuprofen (MOTRIN) 600 mg tablet Take 1 Tablet by mouth every six (6) hours as needed for Pain. 18 Tablet 0    FLUoxetine (PROzac) 40 mg capsule Take 80 mg by mouth daily. pravastatin (PRAVACHOL) 10 mg tablet Take 10 mg by mouth.      losartan (COZAAR) 25 mg tablet Take 25 mg by mouth daily. SITagliptin (JANUVIA) 100 mg tablet Take 100 mg by mouth daily. drospirenone-ethinyl estradioL (ANDRWE) 3-0.02 mg tab Take 1 Tablet by mouth daily. Family History:  History reviewed. No pertinent family history.     Social History:  Social History     Tobacco Use    Smoking status: Never Smokeless tobacco: Never   Vaping Use    Vaping Use: Never used   Substance Use Topics    Alcohol use: Not Currently    Drug use: Not Currently       Allergies:  No Known Allergies    All the above components of the past  history are auto-populated from the electronic record. They have been reviewed and the patient has been interviewed for any pertinent past history that pertains to the patient's chief complaint and reason for visit. Not all pre-populated components may be accurate at the time this note was generated. Review of Systems   Review of Systems   Constitutional:  Negative for fever. Respiratory:  Negative for shortness of breath. Cardiovascular:  Negative for chest pain. Gastrointestinal:  Negative for constipation, diarrhea, nausea and vomiting. Genitourinary:  Negative for difficulty urinating. Musculoskeletal:  Positive for back pain. Neurological:  Negative for dizziness and light-headedness. Physical Exam   Physical Exam  Vitals and nursing note reviewed. Constitutional:       General: She is not in acute distress. Appearance: She is well-developed. She is not ill-appearing. Cardiovascular:      Rate and Rhythm: Normal rate and regular rhythm. Pulmonary:      Effort: Pulmonary effort is normal. No accessory muscle usage or respiratory distress. Breath sounds: Normal breath sounds. Musculoskeletal:      Lumbar back: Bony tenderness present. Positive right straight leg raise test.   Skin:     General: Skin is warm and dry. Neurological:      Mental Status: She is alert and oriented to person, place, and time. Diagnostic Study Results     Labs -   No results found for this or any previous visit (from the past 24 hour(s)).     Radiologic Studies -   No orders to display     CT Results  (Last 48 hours)      None          CXR Results  (Last 48 hours)      None              Medical Decision Making     I reviewed the vital signs, available nursing notes, past medical history, past surgical history, family history and social history. Vital Signs-I have reviewed the vital signs that have been made available during the patient's emergency department visit. The vital signs auto-populated below are obtained mostly by electronic means through monitoring devices that have been downloaded into the patient's chart by the nursing staff. Some vital signs are not downloaded into the chart until after the patient has been discharged and this note has been completed, therefore some vital signs may not be available to the physician for review prior to patient's discharge or admission. The physician has reviewed the patient's triage vital signs, monitored the electronic monitoring devices remotely for any significant vital sign abnormalities, and have reviewed vital signs prior to discharge. Some vital signs reviewed at bedside or remotely utilizing electronic monitoring devices may be different than the vital signs downloaded into the electronic medical record. Some vital signs may be erroneous and inaccurate since they are obtained by electronic monitoring devices, and not all vital signs are verified for accuracy by nursing staff prior to downloading into the patient's chart. Patient Vitals for the past 24 hrs:   Temp Pulse Resp BP SpO2   01/11/23 1104 97.5 °F (36.4 °C) 87 17 (!) 156/80 100 %         Records Reviewed: Nursing notes for today's visit have been reviewed. I have also reviewed most recent medical records pertinent to today's complaints, if available in our medical record system. I have also reviewed all labs and imaging results from previous results in comparison to results obtained today. If an EKG was obtained today, it has been compared to previous EKGs, if available. If arriving via EMS, the EMS report has been reviewed if made available to us within the patient's time in the emergency department.     ED Course and Medical Decision Making: MDM  Number of Diagnoses or Management Options  Acute right lumbar radiculopathy: new, no workup  Diagnosis management comments: Patient presents with symptoms consistent with an acute lumbar radiculopathy. She does have midline tenderness along the spine likely indicating herniated disc. No accidents or injuries prior to this episode of pain. Do not feel that imaging limited to x-rays or CT here in the emergency department would be beneficial.  May consider outpatient MRI through her primary care doctor if symptoms do not improve with medications or get worse. Since the patient does have uncontrolled diabetes I will not consider steroids today. Would like for her to stick with anti-inflammatories and I will prescribe muscle relaxers and a very limited course of hydrocodone. Will refer to her primary care doctor and a spine specialist.       Amount and/or Complexity of Data Reviewed  Discussion of test results with the performing providers: no  Decide to obtain previous medical records or to obtain history from someone other than the patient: no  Obtain history from someone other than the patient: no  Review and summarize past medical records: yes  Discuss the patient with other providers: no  Independent visualization of images, tracings, or specimens: no    Risk of Complications, Morbidity, and/or Mortality  Presenting problems: low  Management options: low    Patient Progress  Patient progress: stable           Orders Placed This Encounter    dapagliflozin (FARXIGA) 10 mg tab tablet    ketorolac (TORADOL) injection 30 mg    naproxen (NAPROSYN) 500 mg tablet    tiZANidine (ZANAFLEX) 4 mg tablet    HYDROcodone-acetaminophen (Lorcet, HYDROcodone,) 5-325 mg per tablet       Procedures      Critical Care Time:   0    Disposition:  Discharge    The patient's emergency department evaluation is now complete. I have reviewed all labs, imaging, and pertinent information.   I have discussed all results with the patient and/or family. Based on our evaluation today I do believe that the patient is safe to be discharged home. The patient has been provided with at home instructions that are pertinent to their complaint today, although these may not be specific to the exact diagnosis. I have reviewed the patient's home medications and attempted to reconcile if not already done so by pharmacy or nursing staff. I have discussed all new prescriptions with the patient. The patient has been encouraged to follow-up with primary care doctor and/or specialist, and these have been discussed with the patient. The patient has been advised that they may return to the emergency department if they have any worsening symptoms and or new symptoms that are of concern to them. Verbal discharge instructions may have also been provided to the patient that may not be specifically contained in the written discharge instructions. The patient has been given opportunity to ask questions prior to discharge. PLAN:  1. Current Discharge Medication List        START taking these medications    Details   naproxen (NAPROSYN) 500 mg tablet Take 1 Tablet by mouth every twelve (12) hours as needed for Pain. Qty: 10 Tablet, Refills: 0  Start date: 1/11/2023      tiZANidine (ZANAFLEX) 4 mg tablet Take 1 Tablet by mouth three (3) times daily as needed for Muscle Spasm(s). Qty: 15 Tablet, Refills: 0  Start date: 1/11/2023      HYDROcodone-acetaminophen (Lorcet, HYDROcodone,) 5-325 mg per tablet Take 1 Tablet by mouth every eight (8) hours as needed for Pain for up to 3 days. Max Daily Amount: 3 Tablets. Qty: 9 Tablet, Refills: 0  Start date: 1/11/2023, End date: 1/14/2023    Associated Diagnoses: Acute right lumbar radiculopathy           2.    Follow-up Information       Follow up With Specialties Details Why Contact Ashleigh Jaimes MD Internal Medicine Physician Schedule an appointment as soon as possible for a visit in 1 week  [address]  [phone]      Lewis Gale MD Orthopedic Surgery Schedule an appointment as soon as possible for a visit  spine specialist [address] [facility]  [phone]            Return to ED if worse     Diagnosis     Clinical Impression:   1.  Acute right lumbar radiculopathy

## 2023-03-21 ENCOUNTER — HOSPITAL ENCOUNTER (EMERGENCY)
Age: 53
Discharge: HOME | End: 2023-03-21
Attending: FAMILY MEDICINE | Admitting: FAMILY MEDICINE

## 2023-03-21 VITALS
SYSTOLIC BLOOD PRESSURE: 140 MMHG | BODY MASS INDEX: 30.73 KG/M2 | TEMPERATURE: 97.8 F | HEART RATE: 97 BPM | WEIGHT: 180 LBS | DIASTOLIC BLOOD PRESSURE: 80 MMHG | OXYGEN SATURATION: 99 % | HEIGHT: 64 IN | RESPIRATION RATE: 16 BRPM

## 2023-03-21 DIAGNOSIS — M25.522 LEFT ELBOW PAIN: Primary | ICD-10-CM

## 2023-03-21 DIAGNOSIS — M62.838 SPASM OF MUSCLE: ICD-10-CM

## 2023-03-21 PROCEDURE — 99283 EMERGENCY DEPT VISIT LOW MDM: CPT | Performed by: FAMILY MEDICINE

## 2023-03-21 PROCEDURE — 6370000000 HC RX 637 (ALT 250 FOR IP): Performed by: FAMILY MEDICINE

## 2023-03-21 RX ORDER — BACLOFEN 10 MG/1
20 TABLET ORAL
Status: COMPLETED | OUTPATIENT
Start: 2023-03-21 | End: 2023-03-21

## 2023-03-21 RX ORDER — CYCLOBENZAPRINE HCL 10 MG
10 TABLET ORAL 3 TIMES DAILY PRN
COMMUNITY
End: 2023-03-21

## 2023-03-21 RX ORDER — BACLOFEN 10 MG/1
10 TABLET ORAL 3 TIMES DAILY PRN
Qty: 20 TABLET | Refills: 0 | Status: SHIPPED | OUTPATIENT
Start: 2023-03-21

## 2023-03-21 RX ADMIN — BACLOFEN 20 MG: 10 TABLET ORAL at 19:29

## 2023-03-21 ASSESSMENT — PAIN SCALES - GENERAL: PAINLEVEL_OUTOF10: 10

## 2023-03-21 ASSESSMENT — PAIN DESCRIPTION - ORIENTATION: ORIENTATION: LEFT

## 2023-03-21 ASSESSMENT — PAIN - FUNCTIONAL ASSESSMENT: PAIN_FUNCTIONAL_ASSESSMENT: 0-10

## 2023-03-21 ASSESSMENT — PAIN DESCRIPTION - LOCATION: LOCATION: ARM

## 2023-03-21 NOTE — ED TRIAGE NOTES
Pt states she started with left elbow pain yesterday, states it started out mild, but has gotten worse  Pt denies injury  Pain started in elbow, but is now up into left shoulder and neck

## 2023-03-21 NOTE — ED NOTES
Discharge instructions reviewed with patient. Patient verbalized understanding.       Diana Camacho RN  03/21/23 4151

## 2023-03-21 NOTE — ED PROVIDER NOTES
dictation. EMERGENCY DEPARTMENT COURSE and DIFFERENTIAL DIAGNOSIS/MDM:   Vitals:    Vitals:    03/21/23 1800   BP: (!) 141/77   Pulse: 93   Resp: 16   Temp: 97.8 °F (36.6 °C)   SpO2: 100%   Weight: 180 lb (81.6 kg)   Height: 5' 4\" (1.626 m)         Medical Decision Making  Ddx including arthritis, tendonitis, bursitis, gout, muscle spasm, epicondylitis. No indication for imagining as no injury occurred. Evaluation consistent with msk etiology. Patient given muscle relaxant to take in addition to her arthritis medication. Discussed stretching and hydration. Stable for discharge with outpatient follow up    Amount and/or Complexity of Data Reviewed  External Data Reviewed: labs, radiology and notes. Risk  Prescription drug management. REASSESSMENT          CRITICAL CARE TIME   Total Critical Care time was NA minutes, excluding separately reportable procedures. There was a high probability of clinically significant/life threatening deterioration in the patient's condition which required my urgent intervention. CONSULTS:  None    PROCEDURES:  Unless otherwise noted below, none     Procedures        FINAL IMPRESSION    No diagnosis found. DISPOSITION/PLAN   DISPOSITION        PATIENT REFERRED TO:  No follow-up provider specified. DISCHARGE MEDICATIONS:  New Prescriptions    No medications on file     Controlled Substances Monitoring:     No flowsheet data found.     (Please note that portions of this note were completed with a voice recognition program.  Efforts were made to edit the dictations but occasionally words are mis-transcribed.)    Ksenia Snider DO (electronically signed)  Attending Emergency Physician            Ksenia Snider DO  03/22/23 4964

## 2023-03-22 ASSESSMENT — ENCOUNTER SYMPTOMS
RESPIRATORY NEGATIVE: 1
GASTROINTESTINAL NEGATIVE: 1